# Patient Record
Sex: FEMALE | Race: WHITE | NOT HISPANIC OR LATINO | URBAN - METROPOLITAN AREA
[De-identification: names, ages, dates, MRNs, and addresses within clinical notes are randomized per-mention and may not be internally consistent; named-entity substitution may affect disease eponyms.]

---

## 2017-04-12 PROBLEM — Z00.00 ENCOUNTER FOR PREVENTIVE HEALTH EXAMINATION: Status: ACTIVE | Noted: 2017-04-12

## 2017-07-12 ENCOUNTER — OUTPATIENT (OUTPATIENT)
Dept: OUTPATIENT SERVICES | Facility: HOSPITAL | Age: 77
LOS: 1 days | End: 2017-07-12
Payer: COMMERCIAL

## 2017-07-12 DIAGNOSIS — R06.09 OTHER FORMS OF DYSPNEA: ICD-10-CM

## 2017-07-12 PROCEDURE — A9502: CPT

## 2017-07-12 PROCEDURE — 93017 CV STRESS TEST TRACING ONLY: CPT

## 2017-07-12 PROCEDURE — 78452 HT MUSCLE IMAGE SPECT MULT: CPT

## 2017-07-26 ENCOUNTER — APPOINTMENT (OUTPATIENT)
Dept: RADIOLOGY | Facility: HOSPITAL | Age: 77
End: 2017-07-26

## 2017-07-26 ENCOUNTER — OUTPATIENT (OUTPATIENT)
Dept: OUTPATIENT SERVICES | Facility: HOSPITAL | Age: 77
LOS: 1 days | End: 2017-07-26
Payer: MEDICARE

## 2017-07-26 ENCOUNTER — OUTPATIENT (OUTPATIENT)
Dept: OUTPATIENT SERVICES | Facility: HOSPITAL | Age: 77
LOS: 1 days | End: 2017-07-26

## 2017-07-26 ENCOUNTER — APPOINTMENT (OUTPATIENT)
Dept: NUCLEAR MEDICINE | Facility: HOSPITAL | Age: 77
End: 2017-07-26

## 2017-07-26 DIAGNOSIS — R06.02 SHORTNESS OF BREATH: ICD-10-CM

## 2017-07-26 DIAGNOSIS — R06.09 OTHER FORMS OF DYSPNEA: ICD-10-CM

## 2017-07-26 PROCEDURE — 71020: CPT | Mod: 26

## 2017-09-19 ENCOUNTER — APPOINTMENT (OUTPATIENT)
Dept: OTOLARYNGOLOGY | Facility: CLINIC | Age: 77
End: 2017-09-19
Payer: MEDICARE

## 2017-09-19 VITALS
SYSTOLIC BLOOD PRESSURE: 130 MMHG | WEIGHT: 126 LBS | DIASTOLIC BLOOD PRESSURE: 66 MMHG | HEART RATE: 62 BPM | HEIGHT: 64 IN | BODY MASS INDEX: 21.51 KG/M2

## 2017-09-19 PROCEDURE — 69210 REMOVE IMPACTED EAR WAX UNI: CPT

## 2017-09-19 PROCEDURE — 99204 OFFICE O/P NEW MOD 45 MIN: CPT | Mod: 25

## 2017-09-19 RX ORDER — LOSARTAN POTASSIUM 100 MG/1
100 TABLET, FILM COATED ORAL
Refills: 0 | Status: ACTIVE | COMMUNITY

## 2017-09-29 ENCOUNTER — APPOINTMENT (OUTPATIENT)
Dept: PULMONOLOGY | Facility: CLINIC | Age: 77
End: 2017-09-29
Payer: MEDICARE

## 2017-09-29 VITALS
OXYGEN SATURATION: 94 % | WEIGHT: 126 LBS | DIASTOLIC BLOOD PRESSURE: 71 MMHG | SYSTOLIC BLOOD PRESSURE: 138 MMHG | RESPIRATION RATE: 16 BRPM | HEART RATE: 67 BPM | BODY MASS INDEX: 21.51 KG/M2 | HEIGHT: 64 IN

## 2017-09-29 DIAGNOSIS — R06.02 SHORTNESS OF BREATH: ICD-10-CM

## 2017-09-29 DIAGNOSIS — Z82.49 FAMILY HISTORY OF ISCHEMIC HEART DISEASE AND OTHER DISEASES OF THE CIRCULATORY SYSTEM: ICD-10-CM

## 2017-09-29 PROCEDURE — 94729 DIFFUSING CAPACITY: CPT

## 2017-09-29 PROCEDURE — 94060 EVALUATION OF WHEEZING: CPT

## 2017-09-29 PROCEDURE — ZZZZZ: CPT

## 2017-09-29 PROCEDURE — 99204 OFFICE O/P NEW MOD 45 MIN: CPT | Mod: 25

## 2017-09-29 PROCEDURE — 94726 PLETHYSMOGRAPHY LUNG VOLUMES: CPT

## 2018-04-02 ENCOUNTER — APPOINTMENT (OUTPATIENT)
Dept: ORTHOPEDIC SURGERY | Facility: CLINIC | Age: 78
End: 2018-04-02

## 2018-07-10 ENCOUNTER — APPOINTMENT (OUTPATIENT)
Dept: OTOLARYNGOLOGY | Facility: CLINIC | Age: 78
End: 2018-07-10
Payer: MEDICARE

## 2018-07-10 VITALS
BODY MASS INDEX: 22.15 KG/M2 | DIASTOLIC BLOOD PRESSURE: 83 MMHG | SYSTOLIC BLOOD PRESSURE: 141 MMHG | WEIGHT: 125 LBS | HEIGHT: 63 IN | HEART RATE: 56 BPM

## 2018-07-10 PROCEDURE — 69210 REMOVE IMPACTED EAR WAX UNI: CPT

## 2018-07-10 PROCEDURE — 99213 OFFICE O/P EST LOW 20 MIN: CPT | Mod: 25

## 2018-09-18 ENCOUNTER — APPOINTMENT (OUTPATIENT)
Dept: OTOLARYNGOLOGY | Facility: CLINIC | Age: 78
End: 2018-09-18
Payer: MEDICARE

## 2018-09-18 VITALS
BODY MASS INDEX: 22.15 KG/M2 | WEIGHT: 125 LBS | SYSTOLIC BLOOD PRESSURE: 132 MMHG | HEART RATE: 60 BPM | DIASTOLIC BLOOD PRESSURE: 61 MMHG | HEIGHT: 63 IN

## 2018-09-18 PROCEDURE — 99213 OFFICE O/P EST LOW 20 MIN: CPT | Mod: 25

## 2018-09-18 PROCEDURE — 69210 REMOVE IMPACTED EAR WAX UNI: CPT

## 2018-10-12 ENCOUNTER — APPOINTMENT (OUTPATIENT)
Dept: ENDOCRINOLOGY | Facility: CLINIC | Age: 78
End: 2018-10-12
Payer: MEDICARE

## 2018-10-12 VITALS
TEMPERATURE: 98 F | RESPIRATION RATE: 16 BRPM | BODY MASS INDEX: 22.5 KG/M2 | SYSTOLIC BLOOD PRESSURE: 158 MMHG | HEART RATE: 57 BPM | DIASTOLIC BLOOD PRESSURE: 74 MMHG | WEIGHT: 127 LBS | OXYGEN SATURATION: 96 % | HEIGHT: 63 IN

## 2018-10-12 DIAGNOSIS — Z87.898 PERSONAL HISTORY OF OTHER SPECIFIED CONDITIONS: ICD-10-CM

## 2018-10-12 DIAGNOSIS — Z85.9 PERSONAL HISTORY OF MALIGNANT NEOPLASM, UNSPECIFIED: ICD-10-CM

## 2018-10-12 DIAGNOSIS — Z82.49 FAMILY HISTORY OF ISCHEMIC HEART DISEASE AND OTHER DISEASES OF THE CIRCULATORY SYSTEM: ICD-10-CM

## 2018-10-12 DIAGNOSIS — Z86.79 PERSONAL HISTORY OF OTHER DISEASES OF THE CIRCULATORY SYSTEM: ICD-10-CM

## 2018-10-12 DIAGNOSIS — R06.09 OTHER FORMS OF DYSPNEA: ICD-10-CM

## 2018-10-12 DIAGNOSIS — Z80.52 FAMILY HISTORY OF MALIGNANT NEOPLASM OF BLADDER: ICD-10-CM

## 2018-10-12 DIAGNOSIS — Z87.39 PERSONAL HISTORY OF OTHER DISEASES OF THE MUSCULOSKELETAL SYSTEM AND CONNECTIVE TISSUE: ICD-10-CM

## 2018-10-12 DIAGNOSIS — Z78.9 OTHER SPECIFIED HEALTH STATUS: ICD-10-CM

## 2018-10-12 DIAGNOSIS — I10 ESSENTIAL (PRIMARY) HYPERTENSION: ICD-10-CM

## 2018-10-12 DIAGNOSIS — Z80.0 FAMILY HISTORY OF MALIGNANT NEOPLASM OF DIGESTIVE ORGANS: ICD-10-CM

## 2018-10-12 DIAGNOSIS — Z80.8 FAMILY HISTORY OF MALIGNANT NEOPLASM OF OTHER ORGANS OR SYSTEMS: ICD-10-CM

## 2018-10-12 PROCEDURE — 99204 OFFICE O/P NEW MOD 45 MIN: CPT

## 2018-10-14 LAB
25(OH)D3 SERPL-MCNC: 38.9 NG/ML
ANION GAP SERPL CALC-SCNC: 14 MMOL/L
BASOPHILS # BLD AUTO: 0.02 K/UL
BASOPHILS NFR BLD AUTO: 0.4 %
BUN SERPL-MCNC: 16 MG/DL
CALCIUM SERPL-MCNC: 10 MG/DL
CALCIUM SERPL-MCNC: 10 MG/DL
CHLORIDE SERPL-SCNC: 102 MMOL/L
CO2 SERPL-SCNC: 26 MMOL/L
CREAT SERPL-MCNC: 0.72 MG/DL
EOSINOPHIL # BLD AUTO: 0.03 K/UL
EOSINOPHIL NFR BLD AUTO: 0.6 %
ESTRADIOL SERPL-MCNC: 13 PG/ML
FSH SERPL-MCNC: 65.3 IU/L
GLUCOSE SERPL-MCNC: 84 MG/DL
HCT VFR BLD CALC: 41.8 %
HGB BLD-MCNC: 12.7 G/DL
IMM GRANULOCYTES NFR BLD AUTO: 0.2 %
LH SERPL-ACNC: 31.9 IU/L
LYMPHOCYTES # BLD AUTO: 1.67 K/UL
LYMPHOCYTES NFR BLD AUTO: 31.2 %
MAN DIFF?: NORMAL
MCHC RBC-ENTMCNC: 30.4 GM/DL
MCHC RBC-ENTMCNC: 30.9 PG
MCV RBC AUTO: 101.7 FL
MONOCYTES # BLD AUTO: 0.42 K/UL
MONOCYTES NFR BLD AUTO: 7.9 %
NEUTROPHILS # BLD AUTO: 3.2 K/UL
NEUTROPHILS NFR BLD AUTO: 59.7 %
PARATHYROID HORMONE INTACT: 32 PG/ML
PLATELET # BLD AUTO: 244 K/UL
POTASSIUM SERPL-SCNC: 4.4 MMOL/L
PROLACTIN SERPL-MCNC: 16 NG/ML
RBC # BLD: 4.11 M/UL
RBC # FLD: 13.6 %
SODIUM SERPL-SCNC: 142 MMOL/L
T4 FREE SERPL-MCNC: 1.1 NG/DL
TSH SERPL-ACNC: 0.78 UIU/ML
WBC # FLD AUTO: 5.35 K/UL

## 2018-11-21 ENCOUNTER — APPOINTMENT (OUTPATIENT)
Dept: ENDOCRINOLOGY | Facility: CLINIC | Age: 78
End: 2018-11-21
Payer: MEDICARE

## 2018-11-21 VITALS
OXYGEN SATURATION: 96 % | BODY MASS INDEX: 22.15 KG/M2 | HEART RATE: 62 BPM | TEMPERATURE: 97.4 F | HEIGHT: 63 IN | DIASTOLIC BLOOD PRESSURE: 80 MMHG | WEIGHT: 125 LBS | RESPIRATION RATE: 16 BRPM | SYSTOLIC BLOOD PRESSURE: 147 MMHG

## 2018-11-21 DIAGNOSIS — E78.5 HYPERLIPIDEMIA, UNSPECIFIED: ICD-10-CM

## 2018-11-21 PROCEDURE — 99213 OFFICE O/P EST LOW 20 MIN: CPT

## 2018-12-03 ENCOUNTER — APPOINTMENT (OUTPATIENT)
Dept: ENDOCRINOLOGY | Facility: CLINIC | Age: 78
End: 2018-12-03
Payer: MEDICARE

## 2018-12-03 VITALS
WEIGHT: 128 LBS | HEIGHT: 63 IN | RESPIRATION RATE: 16 BRPM | BODY MASS INDEX: 22.68 KG/M2 | SYSTOLIC BLOOD PRESSURE: 146 MMHG | HEART RATE: 58 BPM | DIASTOLIC BLOOD PRESSURE: 82 MMHG | OXYGEN SATURATION: 97 %

## 2018-12-03 PROCEDURE — 96372 THER/PROPH/DIAG INJ SC/IM: CPT

## 2018-12-03 RX ORDER — DENOSUMAB 60 MG/ML
60 INJECTION SUBCUTANEOUS
Qty: 1 | Refills: 0 | Status: COMPLETED | OUTPATIENT
Start: 2018-12-03

## 2018-12-03 RX ADMIN — DENOSUMAB 0 MG/ML: 60 INJECTION SUBCUTANEOUS at 00:00

## 2019-01-03 ENCOUNTER — APPOINTMENT (OUTPATIENT)
Dept: ENDOCRINOLOGY | Facility: CLINIC | Age: 79
End: 2019-01-03

## 2019-03-19 ENCOUNTER — APPOINTMENT (OUTPATIENT)
Dept: OTOLARYNGOLOGY | Facility: CLINIC | Age: 79
End: 2019-03-19
Payer: MEDICARE

## 2019-03-19 VITALS
HEIGHT: 63 IN | BODY MASS INDEX: 22.68 KG/M2 | HEART RATE: 57 BPM | DIASTOLIC BLOOD PRESSURE: 70 MMHG | WEIGHT: 128 LBS | SYSTOLIC BLOOD PRESSURE: 134 MMHG

## 2019-03-19 PROCEDURE — 69210 REMOVE IMPACTED EAR WAX UNI: CPT

## 2019-03-19 PROCEDURE — 99213 OFFICE O/P EST LOW 20 MIN: CPT | Mod: 25

## 2019-03-19 RX ORDER — ASPIRIN 81 MG
81 TABLET, DELAYED RELEASE (ENTERIC COATED) ORAL
Refills: 0 | Status: COMPLETED | COMMUNITY
End: 2019-03-19

## 2019-03-19 RX ORDER — ATORVASTATIN CALCIUM 20 MG/1
20 TABLET, FILM COATED ORAL
Refills: 0 | Status: COMPLETED | COMMUNITY
End: 2019-03-19

## 2019-03-19 RX ORDER — FUROSEMIDE 20 MG/1
20 TABLET ORAL
Refills: 0 | Status: COMPLETED | COMMUNITY
End: 2019-03-19

## 2019-03-19 RX ORDER — LOSARTAN POTASSIUM 100 MG/1
100 TABLET, FILM COATED ORAL
Refills: 0 | Status: COMPLETED | COMMUNITY
End: 2019-03-19

## 2019-03-19 NOTE — ASSESSMENT
[FreeTextEntry1] : 78F with bilateral muffled hearing in the setting of severe cerumen impaction. Cerumen was removed from either ear with immediate improvement in hearing. The rest of the complete and comprehensive head and neck exam is unremarkable. Advised to keep her ears dry. RTO 6 months for routine cleaning.

## 2019-03-19 NOTE — HISTORY OF PRESENT ILLNESS
[de-identified] : 78F here in followup.\par \par She thinks there is reaccumulation of cerumen since her hearing is now muffled.\par Both ears affected equally. She was seen 6 months prior for severe cerumen impaction in the setting of decreasing hearing. There is no otorrhea, otalgia, tinnitus or vertigo. No recent ear infection. \par \par ROS otherwise negative.

## 2019-03-19 NOTE — CONSULT LETTER
[Dear  ___] : Dear  [unfilled], [Courtesy Letter:] : I had the pleasure of seeing your patient, [unfilled], in my office today. [Consult Closing:] : Thank you very much for allowing me to participate in the care of this patient.  If you have any questions, please do not hesitate to contact me. [Sincerely,] : Sincerely, [Manjit Goins MD] : Manjit Goins MD  [Department of Otolaryngology, Head and Neck Surgery] : Department of Otolaryngology, Head and Neck Surgery [[facility]] : [facility]

## 2019-03-19 NOTE — PHYSICAL EXAM
[Midline] : trachea located in midline position [Normal] : no rashes [FreeTextEntry1] : Au: external ear wnl. EAC occluded with thick moist cerumen, removed w suction. TM intact and mobile, ME clear.\par \par immediate improvement in b/l hearing when disimpacted

## 2019-06-14 ENCOUNTER — APPOINTMENT (OUTPATIENT)
Dept: ENDOCRINOLOGY | Facility: CLINIC | Age: 79
End: 2019-06-14
Payer: MEDICARE

## 2019-06-14 VITALS
OXYGEN SATURATION: 94 % | HEART RATE: 63 BPM | DIASTOLIC BLOOD PRESSURE: 66 MMHG | BODY MASS INDEX: 22.15 KG/M2 | RESPIRATION RATE: 16 BRPM | SYSTOLIC BLOOD PRESSURE: 113 MMHG | WEIGHT: 125 LBS | HEIGHT: 63 IN

## 2019-06-14 PROCEDURE — 99213 OFFICE O/P EST LOW 20 MIN: CPT

## 2019-06-14 RX ORDER — DENOSUMAB 60 MG/ML
60 INJECTION SUBCUTANEOUS
Qty: 1 | Refills: 0 | Status: ACTIVE | COMMUNITY
Start: 2019-06-14 | End: 1900-01-01

## 2019-06-14 RX ORDER — DENOSUMAB 60 MG/ML
60 INJECTION SUBCUTANEOUS
Qty: 1 | Refills: 0 | Status: COMPLETED | COMMUNITY
Start: 2018-11-21 | End: 2019-06-14

## 2019-06-15 NOTE — ASSESSMENT
[FreeTextEntry1] : Patient has Osteoporosis\par Advised to take regularly CValcium plus Vit D\par Advised to walk regularly\par Will order the Prolia injection now

## 2019-06-15 NOTE — PHYSICAL EXAM
[No Acute Distress] : no acute distress [Alert] : alert [Normal Sclera/Conjunctiva] : normal sclera/conjunctiva [PERRL] : pupils equal, round and reactive to light [Normal Outer Ear/Nose] : the ears and nose were normal in appearance [Normal Hearing] : hearing was normal [No Neck Mass] : no neck mass was observed [Thyroid Not Enlarged] : the thyroid was not enlarged [No Respiratory Distress] : no respiratory distress [Normal Rate and Effort] : normal respiratory rhythm and effort [Normal PMI] : the apical impulse was normal [Normal Rate] : heart rate was normal  [Carotids Normal] : carotid pulses were normal with no bruits [Normal Gait] : normal gait [No Clubbing, Cyanosis] : no clubbing  or cyanosis of the fingernails [No Rash] : no rash [No Skin Lesions] : no skin lesions [Normal Reflexes] : deep tendon reflexes were 2+ and symmetric [No Motor Deficits] : the motor exam was normal

## 2019-06-15 NOTE — HISTORY OF PRESENT ILLNESS
[FreeTextEntry1] : Doing well, she has pains on her knees. No back pains. No fragility fractures. The last DEXA scan was done 10/18. On 11/18 she had he Prolia injection.

## 2019-06-19 ENCOUNTER — APPOINTMENT (OUTPATIENT)
Dept: ENDOCRINOLOGY | Facility: CLINIC | Age: 79
End: 2019-06-19
Payer: MEDICARE

## 2019-06-19 VITALS — DIASTOLIC BLOOD PRESSURE: 70 MMHG | RESPIRATION RATE: 16 BRPM | HEART RATE: 60 BPM | SYSTOLIC BLOOD PRESSURE: 131 MMHG

## 2019-06-19 VITALS — HEART RATE: 61 BPM | DIASTOLIC BLOOD PRESSURE: 69 MMHG | SYSTOLIC BLOOD PRESSURE: 128 MMHG

## 2019-06-19 DIAGNOSIS — M81.0 AGE-RELATED OSTEOPOROSIS W/OUT CURRENT PATHOLOGICAL FRACTURE: ICD-10-CM

## 2019-06-19 PROCEDURE — 96401 CHEMO ANTI-NEOPL SQ/IM: CPT

## 2019-06-19 RX ORDER — DENOSUMAB 60 MG/ML
60 INJECTION SUBCUTANEOUS
Qty: 1 | Refills: 0 | Status: COMPLETED | OUTPATIENT
Start: 2019-06-19

## 2019-06-19 RX ADMIN — DENOSUMAB 0 MG/ML: 60 INJECTION SUBCUTANEOUS at 00:00

## 2019-10-03 ENCOUNTER — APPOINTMENT (OUTPATIENT)
Dept: OTOLARYNGOLOGY | Facility: CLINIC | Age: 79
End: 2019-10-03
Payer: MEDICARE

## 2019-10-03 VITALS
DIASTOLIC BLOOD PRESSURE: 73 MMHG | HEIGHT: 63 IN | SYSTOLIC BLOOD PRESSURE: 127 MMHG | HEART RATE: 59 BPM | BODY MASS INDEX: 22.5 KG/M2 | WEIGHT: 127 LBS

## 2019-10-03 DIAGNOSIS — H91.93 UNSPECIFIED HEARING LOSS, BILATERAL: ICD-10-CM

## 2019-10-03 DIAGNOSIS — H61.23 IMPACTED CERUMEN, BILATERAL: ICD-10-CM

## 2019-10-03 PROCEDURE — 69210 REMOVE IMPACTED EAR WAX UNI: CPT

## 2019-10-03 PROCEDURE — 99213 OFFICE O/P EST LOW 20 MIN: CPT | Mod: 25

## 2019-10-03 NOTE — ASSESSMENT
[FreeTextEntry1] : 79F with bilateral muffled hearing in the setting of severe cerumen impaction. Cerumen was removed from either ear with suction with immediate improvement in hearing. The rest of the complete and comprehensive head and neck exam is unremarkable. Advised to keep her ears dry. RTO 6 months for routine cleaning.

## 2019-10-03 NOTE — HISTORY OF PRESENT ILLNESS
[de-identified] : 79F here in followup.\par \par She thinks there is reaccumulation of cerumen since her hearing is now muffled.\par Both ears affected equally. She was seen 6 months prior for severe cerumen impaction in the setting of decreasing hearing. There is no otorrhea, otalgia, tinnitus or vertigo. No recent ear infection. \par \par ROS otherwise negative.

## 2019-10-03 NOTE — CONSULT LETTER
[Dear  ___] : Dear  [unfilled], [Courtesy Letter:] : I had the pleasure of seeing your patient, [unfilled], in my office today. [Consult Closing:] : Thank you very much for allowing me to participate in the care of this patient.  If you have any questions, please do not hesitate to contact me. [Sincerely,] : Sincerely, [Manjit Goins MD] : Manjit Goins MD  [Department of Otolaryngology, Head and Neck Surgery] : Department of Otolaryngology, Head and Neck Surgery [[facility]] : [facility]

## 2019-10-03 NOTE — PHYSICAL EXAM
[Midline] : trachea located in midline position [Normal] : no rashes [FreeTextEntry1] : Au: external ear wnl. EAC (left>right) occluded with thick moist cerumen, removed w suction. TM intact and mobile, ME clear.\par \par immediate improvement in b/l hearing when disimpacted

## 2020-04-03 ENCOUNTER — APPOINTMENT (OUTPATIENT)
Dept: OTOLARYNGOLOGY | Facility: CLINIC | Age: 80
End: 2020-04-03

## 2020-07-21 ENCOUNTER — APPOINTMENT (OUTPATIENT)
Dept: OTOLARYNGOLOGY | Facility: CLINIC | Age: 80
End: 2020-07-21

## 2021-10-25 DIAGNOSIS — Z01.818 ENCOUNTER FOR OTHER PREPROCEDURAL EXAMINATION: ICD-10-CM

## 2021-10-26 ENCOUNTER — APPOINTMENT (OUTPATIENT)
Dept: DISASTER EMERGENCY | Facility: CLINIC | Age: 81
End: 2021-10-26

## 2021-10-27 LAB — SARS-COV-2 N GENE NPH QL NAA+PROBE: NOT DETECTED

## 2021-10-29 ENCOUNTER — OUTPATIENT (OUTPATIENT)
Dept: OUTPATIENT SERVICES | Facility: HOSPITAL | Age: 81
LOS: 1 days | Discharge: HOME | End: 2021-10-29
Payer: MEDICARE

## 2021-10-29 DIAGNOSIS — Z90.710 ACQUIRED ABSENCE OF BOTH CERVIX AND UTERUS: Chronic | ICD-10-CM

## 2021-10-29 DIAGNOSIS — Z98.890 OTHER SPECIFIED POSTPROCEDURAL STATES: Chronic | ICD-10-CM

## 2021-10-29 DIAGNOSIS — R07.9 CHEST PAIN, UNSPECIFIED: ICD-10-CM

## 2021-10-29 LAB
ANION GAP SERPL CALC-SCNC: 12 MMOL/L — SIGNIFICANT CHANGE UP (ref 7–14)
BUN SERPL-MCNC: 17 MG/DL — SIGNIFICANT CHANGE UP (ref 7–23)
CALCIUM SERPL-MCNC: 9.6 MG/DL — SIGNIFICANT CHANGE UP (ref 8.4–10.5)
CHLORIDE SERPL-SCNC: 104 MMOL/L — SIGNIFICANT CHANGE UP (ref 98–107)
CO2 SERPL-SCNC: 25 MMOL/L — SIGNIFICANT CHANGE UP (ref 22–31)
CREAT SERPL-MCNC: 0.68 MG/DL — SIGNIFICANT CHANGE UP (ref 0.5–1.3)
GLUCOSE SERPL-MCNC: 96 MG/DL — SIGNIFICANT CHANGE UP (ref 70–99)
HCT VFR BLD CALC: 38.8 % — SIGNIFICANT CHANGE UP (ref 34.5–45)
HGB BLD-MCNC: 12.3 G/DL — SIGNIFICANT CHANGE UP (ref 11.5–15.5)
MCHC RBC-ENTMCNC: 31.7 GM/DL — LOW (ref 32–36)
MCHC RBC-ENTMCNC: 32.5 PG — SIGNIFICANT CHANGE UP (ref 27–34)
MCV RBC AUTO: 102.4 FL — HIGH (ref 80–100)
NRBC # BLD: 0 /100 WBCS — SIGNIFICANT CHANGE UP
NRBC # FLD: 0 K/UL — SIGNIFICANT CHANGE UP
PLATELET # BLD AUTO: 209 K/UL — SIGNIFICANT CHANGE UP (ref 150–400)
POTASSIUM SERPL-MCNC: 3.8 MMOL/L — SIGNIFICANT CHANGE UP (ref 3.5–5.3)
POTASSIUM SERPL-SCNC: 3.8 MMOL/L — SIGNIFICANT CHANGE UP (ref 3.5–5.3)
RBC # BLD: 3.79 M/UL — LOW (ref 3.8–5.2)
RBC # FLD: 12.7 % — SIGNIFICANT CHANGE UP (ref 10.3–14.5)
SODIUM SERPL-SCNC: 141 MMOL/L — SIGNIFICANT CHANGE UP (ref 135–145)
WBC # BLD: 4.92 K/UL — SIGNIFICANT CHANGE UP (ref 3.8–10.5)
WBC # FLD AUTO: 4.92 K/UL — SIGNIFICANT CHANGE UP (ref 3.8–10.5)

## 2021-10-29 PROCEDURE — 99152 MOD SED SAME PHYS/QHP 5/>YRS: CPT

## 2021-10-29 PROCEDURE — 93010 ELECTROCARDIOGRAM REPORT: CPT | Mod: 76

## 2021-10-29 PROCEDURE — 92928 PRQ TCAT PLMT NTRAC ST 1 LES: CPT | Mod: RC

## 2021-10-29 PROCEDURE — 93458 L HRT ARTERY/VENTRICLE ANGIO: CPT | Mod: 26,59

## 2021-10-29 RX ORDER — METOPROLOL TARTRATE 50 MG
0.5 TABLET ORAL
Qty: 30 | Refills: 0
Start: 2021-10-29 | End: 2021-11-27

## 2021-10-29 RX ORDER — OMEGA-3 ACID ETHYL ESTERS 1 G
1 CAPSULE ORAL
Qty: 0 | Refills: 0 | DISCHARGE

## 2021-10-29 RX ORDER — SODIUM CHLORIDE 9 MG/ML
1000 INJECTION INTRAMUSCULAR; INTRAVENOUS; SUBCUTANEOUS
Refills: 0 | Status: DISCONTINUED | OUTPATIENT
Start: 2021-10-29 | End: 2021-11-12

## 2021-10-29 RX ORDER — ISOSORBIDE MONONITRATE 60 MG/1
1 TABLET, EXTENDED RELEASE ORAL
Qty: 0 | Refills: 0 | DISCHARGE

## 2021-10-29 RX ORDER — ATORVASTATIN CALCIUM 80 MG/1
1 TABLET, FILM COATED ORAL
Qty: 0 | Refills: 0 | DISCHARGE

## 2021-10-29 RX ORDER — FUROSEMIDE 40 MG
1 TABLET ORAL
Qty: 0 | Refills: 0 | DISCHARGE

## 2021-10-29 RX ORDER — ASPIRIN/CALCIUM CARB/MAGNESIUM 324 MG
1 TABLET ORAL
Qty: 0 | Refills: 0 | DISCHARGE

## 2021-10-29 RX ORDER — SODIUM CHLORIDE 9 MG/ML
3 INJECTION INTRAMUSCULAR; INTRAVENOUS; SUBCUTANEOUS EVERY 8 HOURS
Refills: 0 | Status: DISCONTINUED | OUTPATIENT
Start: 2021-10-29 | End: 2021-11-12

## 2021-10-29 RX ORDER — METOPROLOL TARTRATE 50 MG
1 TABLET ORAL
Qty: 0 | Refills: 0 | DISCHARGE

## 2021-10-29 RX ORDER — CLOPIDOGREL BISULFATE 75 MG/1
1 TABLET, FILM COATED ORAL
Qty: 30 | Refills: 3
Start: 2021-10-29 | End: 2022-02-25

## 2021-10-29 RX ADMIN — SODIUM CHLORIDE 75 MILLILITER(S): 9 INJECTION INTRAMUSCULAR; INTRAVENOUS; SUBCUTANEOUS at 11:12

## 2021-10-29 RX ADMIN — SODIUM CHLORIDE 3 MILLILITER(S): 9 INJECTION INTRAMUSCULAR; INTRAVENOUS; SUBCUTANEOUS at 17:37

## 2021-10-29 NOTE — H&P CARDIOLOGY - NSICDXPASTMEDICALHX_GEN_ALL_CORE_FT
PAST MEDICAL HISTORY:  Essential hypertension     HLD (hyperlipidemia)     Squamous cell cancer of lip

## 2021-10-29 NOTE — H&P CARDIOLOGY - NSICDXFAMILYHX_GEN_ALL_CORE_FT
FAMILY HISTORY:  Mother  Still living? Unknown  FH: CHF (congestive heart failure), Age at diagnosis: Age Unknown

## 2021-10-29 NOTE — H&P CARDIOLOGY - HISTORY OF PRESENT ILLNESS
80 y/o F with PMH of HTN, HLD presented to [facility] for C for abnormal stress test. As per the patient she has been having chronic >2 years of midsternal chest heaviness and MERRITT. Patient stated that the past year these symptoms have worsened. Patient stated that any minimal exertion she would feel winded and SOB. Patient stated that the chest heaviness is located on the middle of the chest, characterized as a sharp pain, aggravated with exertion, relief with rest, without any radiation, with a severity of 6/10. Patient stated that she is able to walk multiple blocks but it "takes her time to do it". Patient otherwise denied any rest chest pain, fevers, chills, N/V/D/C, abdominal pain, dysuria, melena, hematochezia, recent travel, sick contact, cough, body aches, pleuritic or positional chest pain.     COVID PCR not detected on 10/26/21

## 2021-11-12 ENCOUNTER — TRANSCRIPTION ENCOUNTER (OUTPATIENT)
Age: 81
End: 2021-11-12

## 2024-04-03 ENCOUNTER — INPATIENT (INPATIENT)
Facility: HOSPITAL | Age: 84
LOS: 1 days | Discharge: HOME | DRG: 812 | End: 2024-04-05
Attending: STUDENT IN AN ORGANIZED HEALTH CARE EDUCATION/TRAINING PROGRAM | Admitting: STUDENT IN AN ORGANIZED HEALTH CARE EDUCATION/TRAINING PROGRAM
Payer: COMMERCIAL

## 2024-04-03 VITALS
TEMPERATURE: 98 F | OXYGEN SATURATION: 96 % | SYSTOLIC BLOOD PRESSURE: 131 MMHG | HEART RATE: 72 BPM | RESPIRATION RATE: 18 BRPM | WEIGHT: 130.07 LBS | DIASTOLIC BLOOD PRESSURE: 58 MMHG

## 2024-04-03 DIAGNOSIS — Z29.9 ENCOUNTER FOR PROPHYLACTIC MEASURES, UNSPECIFIED: ICD-10-CM

## 2024-04-03 DIAGNOSIS — I25.10 ATHEROSCLEROTIC HEART DISEASE OF NATIVE CORONARY ARTERY WITHOUT ANGINA PECTORIS: ICD-10-CM

## 2024-04-03 DIAGNOSIS — Z98.890 OTHER SPECIFIED POSTPROCEDURAL STATES: Chronic | ICD-10-CM

## 2024-04-03 DIAGNOSIS — I10 ESSENTIAL (PRIMARY) HYPERTENSION: ICD-10-CM

## 2024-04-03 DIAGNOSIS — D64.9 ANEMIA, UNSPECIFIED: ICD-10-CM

## 2024-04-03 DIAGNOSIS — E78.5 HYPERLIPIDEMIA, UNSPECIFIED: ICD-10-CM

## 2024-04-03 DIAGNOSIS — Z90.710 ACQUIRED ABSENCE OF BOTH CERVIX AND UTERUS: Chronic | ICD-10-CM

## 2024-04-03 PROBLEM — C44.02 SQUAMOUS CELL CARCINOMA OF SKIN OF LIP: Chronic | Status: ACTIVE | Noted: 2021-10-29

## 2024-04-03 LAB
ALBUMIN SERPL ELPH-MCNC: 2.6 G/DL — LOW (ref 3.5–5)
ALP SERPL-CCNC: 71 U/L — SIGNIFICANT CHANGE UP (ref 40–120)
ALT FLD-CCNC: 21 U/L DA — SIGNIFICANT CHANGE UP (ref 10–60)
ANION GAP SERPL CALC-SCNC: 3 MMOL/L — LOW (ref 5–17)
ANISOCYTOSIS BLD QL: SIGNIFICANT CHANGE UP
AST SERPL-CCNC: 19 U/L — SIGNIFICANT CHANGE UP (ref 10–40)
BASOPHILS # BLD AUTO: 0.04 K/UL — SIGNIFICANT CHANGE UP (ref 0–0.2)
BASOPHILS NFR BLD AUTO: 0.8 % — SIGNIFICANT CHANGE UP (ref 0–2)
BILIRUB SERPL-MCNC: 0.1 MG/DL — LOW (ref 0.2–1.2)
BLD GP AB SCN SERPL QL: SIGNIFICANT CHANGE UP
BUN SERPL-MCNC: 16 MG/DL — SIGNIFICANT CHANGE UP (ref 7–18)
CALCIUM SERPL-MCNC: 8.5 MG/DL — SIGNIFICANT CHANGE UP (ref 8.4–10.5)
CHLORIDE SERPL-SCNC: 105 MMOL/L — SIGNIFICANT CHANGE UP (ref 96–108)
CO2 SERPL-SCNC: 29 MMOL/L — SIGNIFICANT CHANGE UP (ref 22–31)
CREAT SERPL-MCNC: 0.69 MG/DL — SIGNIFICANT CHANGE UP (ref 0.5–1.3)
EGFR: 84 ML/MIN/1.73M2 — SIGNIFICANT CHANGE UP
ELLIPTOCYTES BLD QL SMEAR: SLIGHT — SIGNIFICANT CHANGE UP
EOSINOPHIL # BLD AUTO: 0.03 K/UL — SIGNIFICANT CHANGE UP (ref 0–0.5)
EOSINOPHIL NFR BLD AUTO: 0.6 % — SIGNIFICANT CHANGE UP (ref 0–6)
GLUCOSE SERPL-MCNC: 108 MG/DL — HIGH (ref 70–99)
HCT VFR BLD CALC: 20.7 % — CRITICAL LOW (ref 34.5–45)
HCT VFR BLD CALC: 24.8 % — LOW (ref 34.5–45)
HGB BLD-MCNC: 6 G/DL — CRITICAL LOW (ref 11.5–15.5)
HGB BLD-MCNC: 7.4 G/DL — LOW (ref 11.5–15.5)
HYPOCHROMIA BLD QL: SLIGHT — SIGNIFICANT CHANGE UP
IMM GRANULOCYTES NFR BLD AUTO: 0.4 % — SIGNIFICANT CHANGE UP (ref 0–0.9)
IRON SATN MFR SERPL: 12 UG/DL — LOW (ref 40–150)
IRON SATN MFR SERPL: 4 % — LOW (ref 15–50)
LDH SERPL L TO P-CCNC: 208 U/L — SIGNIFICANT CHANGE UP (ref 120–225)
LYMPHOCYTES # BLD AUTO: 1 K/UL — SIGNIFICANT CHANGE UP (ref 1–3.3)
LYMPHOCYTES # BLD AUTO: 20.8 % — SIGNIFICANT CHANGE UP (ref 13–44)
MANUAL SMEAR VERIFICATION: SIGNIFICANT CHANGE UP
MCHC RBC-ENTMCNC: 24.6 PG — LOW (ref 27–34)
MCHC RBC-ENTMCNC: 25.2 PG — LOW (ref 27–34)
MCHC RBC-ENTMCNC: 29 GM/DL — LOW (ref 32–36)
MCHC RBC-ENTMCNC: 29.8 GM/DL — LOW (ref 32–36)
MCV RBC AUTO: 84.4 FL — SIGNIFICANT CHANGE UP (ref 80–100)
MCV RBC AUTO: 84.8 FL — SIGNIFICANT CHANGE UP (ref 80–100)
MICROCYTES BLD QL: SIGNIFICANT CHANGE UP
MONOCYTES # BLD AUTO: 0.55 K/UL — SIGNIFICANT CHANGE UP (ref 0–0.9)
MONOCYTES NFR BLD AUTO: 11.5 % — SIGNIFICANT CHANGE UP (ref 2–14)
NEUTROPHILS # BLD AUTO: 3.16 K/UL — SIGNIFICANT CHANGE UP (ref 1.8–7.4)
NEUTROPHILS NFR BLD AUTO: 65.9 % — SIGNIFICANT CHANGE UP (ref 43–77)
NRBC # BLD: 0 /100 WBCS — SIGNIFICANT CHANGE UP (ref 0–0)
NRBC # BLD: 0 /100 WBCS — SIGNIFICANT CHANGE UP (ref 0–0)
OVALOCYTES BLD QL SMEAR: SLIGHT — SIGNIFICANT CHANGE UP
PLAT MORPH BLD: NORMAL — SIGNIFICANT CHANGE UP
PLATELET # BLD AUTO: 296 K/UL — SIGNIFICANT CHANGE UP (ref 150–400)
PLATELET # BLD AUTO: 327 K/UL — SIGNIFICANT CHANGE UP (ref 150–400)
POIKILOCYTOSIS BLD QL AUTO: SLIGHT — SIGNIFICANT CHANGE UP
POTASSIUM SERPL-MCNC: 3.9 MMOL/L — SIGNIFICANT CHANGE UP (ref 3.5–5.3)
POTASSIUM SERPL-SCNC: 3.9 MMOL/L — SIGNIFICANT CHANGE UP (ref 3.5–5.3)
PROT SERPL-MCNC: 6 G/DL — SIGNIFICANT CHANGE UP (ref 6–8.3)
RBC # BLD: 2.44 M/UL — LOW (ref 3.8–5.2)
RBC # BLD: 2.44 M/UL — LOW (ref 3.8–5.2)
RBC # BLD: 2.94 M/UL — LOW (ref 3.8–5.2)
RBC # FLD: 15.7 % — HIGH (ref 10.3–14.5)
RBC # FLD: 16.2 % — HIGH (ref 10.3–14.5)
RBC BLD AUTO: ABNORMAL
RETICS #: 50.1 K/UL — SIGNIFICANT CHANGE UP (ref 25–125)
RETICS/RBC NFR: 2 % — SIGNIFICANT CHANGE UP (ref 0.5–2.5)
SCHISTOCYTES BLD QL AUTO: SLIGHT — SIGNIFICANT CHANGE UP
SODIUM SERPL-SCNC: 137 MMOL/L — SIGNIFICANT CHANGE UP (ref 135–145)
TIBC SERPL-MCNC: 330 UG/DL — SIGNIFICANT CHANGE UP (ref 250–450)
UIBC SERPL-MCNC: 318 UG/DL — SIGNIFICANT CHANGE UP (ref 110–370)
WBC # BLD: 4.8 K/UL — SIGNIFICANT CHANGE UP (ref 3.8–10.5)
WBC # BLD: 5.2 K/UL — SIGNIFICANT CHANGE UP (ref 3.8–10.5)
WBC # FLD AUTO: 4.8 K/UL — SIGNIFICANT CHANGE UP (ref 3.8–10.5)
WBC # FLD AUTO: 5.2 K/UL — SIGNIFICANT CHANGE UP (ref 3.8–10.5)

## 2024-04-03 PROCEDURE — 99223 1ST HOSP IP/OBS HIGH 75: CPT | Mod: GC

## 2024-04-03 PROCEDURE — 71045 X-RAY EXAM CHEST 1 VIEW: CPT | Mod: 26

## 2024-04-03 PROCEDURE — 99285 EMERGENCY DEPT VISIT HI MDM: CPT

## 2024-04-03 RX ORDER — DONEPEZIL HYDROCHLORIDE 10 MG/1
5 TABLET, FILM COATED ORAL AT BEDTIME
Refills: 0 | Status: DISCONTINUED | OUTPATIENT
Start: 2024-04-03 | End: 2024-04-05

## 2024-04-03 RX ORDER — DONEPEZIL HYDROCHLORIDE 10 MG/1
1 TABLET, FILM COATED ORAL
Refills: 0 | DISCHARGE

## 2024-04-03 RX ORDER — ONDANSETRON 8 MG/1
4 TABLET, FILM COATED ORAL EVERY 8 HOURS
Refills: 0 | Status: DISCONTINUED | OUTPATIENT
Start: 2024-04-03 | End: 2024-04-05

## 2024-04-03 RX ORDER — PANTOPRAZOLE SODIUM 20 MG/1
40 TABLET, DELAYED RELEASE ORAL EVERY 12 HOURS
Refills: 0 | Status: DISCONTINUED | OUTPATIENT
Start: 2024-04-03 | End: 2024-04-05

## 2024-04-03 RX ORDER — ATORVASTATIN CALCIUM 80 MG/1
20 TABLET, FILM COATED ORAL AT BEDTIME
Refills: 0 | Status: DISCONTINUED | OUTPATIENT
Start: 2024-04-03 | End: 2024-04-05

## 2024-04-03 RX ORDER — SODIUM CHLORIDE 9 MG/ML
1000 INJECTION, SOLUTION INTRAVENOUS
Refills: 0 | Status: DISCONTINUED | OUTPATIENT
Start: 2024-04-03 | End: 2024-04-04

## 2024-04-03 RX ORDER — LANOLIN ALCOHOL/MO/W.PET/CERES
3 CREAM (GRAM) TOPICAL AT BEDTIME
Refills: 0 | Status: DISCONTINUED | OUTPATIENT
Start: 2024-04-03 | End: 2024-04-05

## 2024-04-03 RX ORDER — ACETAMINOPHEN 500 MG
650 TABLET ORAL EVERY 6 HOURS
Refills: 0 | Status: DISCONTINUED | OUTPATIENT
Start: 2024-04-03 | End: 2024-04-05

## 2024-04-03 RX ADMIN — DONEPEZIL HYDROCHLORIDE 5 MILLIGRAM(S): 10 TABLET, FILM COATED ORAL at 21:59

## 2024-04-03 RX ADMIN — ATORVASTATIN CALCIUM 20 MILLIGRAM(S): 80 TABLET, FILM COATED ORAL at 21:59

## 2024-04-03 RX ADMIN — SODIUM CHLORIDE 75 MILLILITER(S): 9 INJECTION, SOLUTION INTRAVENOUS at 20:20

## 2024-04-03 RX ADMIN — SODIUM CHLORIDE 75 MILLILITER(S): 9 INJECTION, SOLUTION INTRAVENOUS at 22:05

## 2024-04-03 RX ADMIN — PANTOPRAZOLE SODIUM 40 MILLIGRAM(S): 20 TABLET, DELAYED RELEASE ORAL at 20:19

## 2024-04-03 NOTE — ED ADULT NURSE NOTE - OBJECTIVE STATEMENT
Patient sent to ER by PCP for low HGB level. Patient reports darker stools & fatigue, denies chest pain or SOB.

## 2024-04-03 NOTE — H&P ADULT - NSHPREVIEWOFSYSTEMS_GEN_ALL_CORE
T(C): 36.9 (04-03-24 @ 12:51), Max: 36.9 (04-03-24 @ 12:51)  HR: 72 (04-03-24 @ 12:51) (72 - 72)  BP: 131/58 (04-03-24 @ 12:51) (131/58 - 131/58)  RR: 18 (04-03-24 @ 12:51) (18 - 18)  SpO2: 96% (04-03-24 @ 12:51) (96% - 96%)    REVIEW OF SYSTEMS:  CONSTITUTIONAL: No weakness, fevers or chills  EYES/ENT: No visual changes;  No vertigo or throat pain   NECK: No pain or stiffness  RESPIRATORY: No cough, wheezing, hemoptysis; No shortness of breath  CARDIOVASCULAR: No chest pain or palpitations  GASTROINTESTINAL: No abdominal or epigastric pain. No nausea, vomiting, or hematemesis; No diarrhea or constipation. No melena or hematochezia.  GENITOURINARY: No dysuria, frequency or hematuria  NEUROLOGICAL: No numbness or weakness  SKIN: No itching, rashes T(C): 36.9 (04-03-24 @ 12:51), Max: 36.9 (04-03-24 @ 12:51)  HR: 72 (04-03-24 @ 12:51) (72 - 72)  BP: 131/58 (04-03-24 @ 12:51) (131/58 - 131/58)  RR: 18 (04-03-24 @ 12:51) (18 - 18)  SpO2: 96% (04-03-24 @ 12:51) (96% - 96%)    REVIEW OF SYSTEMS:  CONSTITUTIONAL: + chronic weakness. no fevers or chills  EYES/ENT: No visual changes;  No vertigo or throat pain   NECK: No pain or stiffness  RESPIRATORY: No cough, wheezing, hemoptysis; No shortness of breath  CARDIOVASCULAR: No chest pain or palpitations  GASTROINTESTINAL: No abdominal or epigastric pain. No nausea, vomiting, or hematemesis; No diarrhea or constipation. No melena or hematochezia.  GENITOURINARY: No dysuria, frequency or hematuria  NEUROLOGICAL: No numbness or weakness  SKIN: No itching, rashes

## 2024-04-03 NOTE — H&P ADULT - ATTENDING COMMENTS
85 yo F with pmh of CAD s/p MINERVA to pRCA (10/29/2021), HTN, HLD, who p/w concerns of low Hgb done in outpatient testing with Hgb of 6.5. Per daughter, prior was noted to be 11 around December at pcp Dr Sauceda’s office. She notes chronic dyspnea with exertion which has remained unchanged. She notes occasional dizziness which had been occurring since she had gotten facial surgery in the past. But no chest pain or palpitations. She notes she had been seeing dark stools for some time now over the past few months, denies iron supplements, pepto bismol. Does not take any pain medications over the counter, denies advil, ibuprofen, etc. Only takes aspirin and plavix. Denies etoh, smoking, drug use. Earlier on in 2023, patient had gotten a guiaic test which was noted to be positive and was scheduled for colonoscopy however it was canceled since anesthesia wanted cardiac clearance/stress testing.     Pt follows with cardiology Dr Ervin, I called NP from his office who checked most recent visit note. Per records, patient is only supposed to be aspirin monotherapy, not plavix. Nuc stress test in Dec 2023 – at Dr Ervin’s office – normal study, no ischemic changes, EF 75%.   Prior records reviewed Oct 29, 2021 – seen by cardiology for abnl stress test and for cath, underwent PCI with MINERVA to pRCA, per note: DAPT x 6 months, aggressive risk factor modification.     Labs, vitals reviewed as above. BP noted 131/58, HR 72, afebrile, saturating well on RA. Hgb noted 6, total iron 12, %sat 4, tibc 330   Physical exam: pale, elderly female, NAD, NC/AT, RRR, lungs CTA, abd soft ntnd, +2 LE edema      A/P   #Anemia   #?Possible GIB   #CAD s/p PCI (2021)   #HTN   #HLD     -trend cbc q8hrs   -active T&S, large bore IV x2   -ordered for 1u pRBC in ED   -hold asa, plavix, no further need for plavix on d/c    -avoid nsaids, asa, AC   -monitor for dark BMs   -IV ppi BID   -GI consulted, recc CTA abd/pelvis   -NPO for now pending imaging results as above   -IVF   -monitor for dark BMs   -can check hemolysis labs   -holding home toprol 25, lasix 20, imdur 30 for now in setting of suspected gib   -dvt ppx, SCDs

## 2024-04-03 NOTE — ED ADULT NURSE NOTE - NSFALLUNIVINTERV_ED_ALL_ED
Bed/Stretcher in lowest position, wheels locked, appropriate side rails in place/Call bell, personal items and telephone in reach/Instruct patient to call for assistance before getting out of bed/chair/stretcher/Non-slip footwear applied when patient is off stretcher/[place] to call system/Physically safe environment - no spills, clutter or unnecessary equipment/Purposeful proactive rounding/Room/bathroom lighting operational, light cord in reach

## 2024-04-03 NOTE — PATIENT PROFILE ADULT - ARRIVAL FROM
21 1230   Pain Assessment   Pain Assessment Tool Pain Assessment not indicated - pt denies pain   Restrictions/Precautions   Precautions 1:1;Bed/chair alarms;Cognitive; Fall Risk;O2;Spinal precautions;Supervision on toilet/commode   Comprehension   Comprehension (FIM) 3 - Understands basic info/conversation 50-74% of time   Expression   Expression (FIM) 4 - Expresses basic info/needs 75-90% of time   Social Interaction   Social Interaction (FIM) 3 - Interacts 50-74% of time   Problem Solving   Problem solving (FIM) 2 - Needs direction more than ½ time to initiate, plan or complete simple tasks   Memory   Memory (FIM) 2 - Recalls 1 of 2 steps   Speech/Language/Cognition Assessmetn   Treatment Assessment Pt continued to be lethargic throughout session, noting fluctuating EDI as in yesterday's session  Of note, some improvement in EDI when seated more upright in bed, but overall ability to maintain EDI remained decreased  Attempted to complete formalized cognitive assessment, BRANT today, however pt's EDI unable to appropriately complete this assessment fairly  Engaged again in informal cognitive assessment, but only able to elicit orientation and LTM biographical information due to fluctuating EDI  Pt remains to be oriented to self, place/city, full date (including month/date/year/ANIYA)  Pt is accurate in regards to diagnosis of COVID in 2021, but unable to determine reasoning for admission currently  Able to be accurate in eliciting , age, address, wife's name and son's name today  However, what was reported today by pt was that he did complete majority of the I ADL tasks, including cooking, cleaning, laundry  Both pt and wife complete finances and pt completes own medication management and driving prior to hospitalization  Again attempting to initiate STM tasks including 4 word recall but pt's EDI was decreased by this point, needing ongoing verbal/tactile cues to maintain EDI   At this time, pt will continue to benefit from SLP services to maximize and establish cognitive skills to target once pt more awake/alert during sessions  SLP Therapy Minutes   SLP Time In 2204   SLP Time Out 1330   SLP Total Time (minutes) 60   SLP Mode of treatment - Individual (minutes) 60   SLP Mode of treatment - Concurrent (minutes) 0   SLP Mode of treatment - Group (minutes) 0   SLP Mode of treatment - Co-treat (minutes) 0   SLP Mode of Treatment - Total time(minutes) 60 minutes   SLP Cumulative Minutes 105   Therapy Time missed   Time missed?  No     ERROR IN MINUTES: SHOULD be time in [id number] for SLP individual tx for 30 minutes Home

## 2024-04-03 NOTE — H&P ADULT - NSHPPHYSICALEXAM_GEN_ALL_CORE
PHYSICAL EXAM:  GENERAL: NAD, speaks in full sentences, no signs of respiratory distress  HEAD:  Atraumatic, Normocephalic  EYES: EOMI, PERRLA, conjunctiva and sclera clear  NECK: Supple, No JVD  CHEST/LUNG: Clear to auscultation bilaterally; No wheeze; No crackles; No accessory muscles used  HEART: Regular rate and rhythm; No murmurs;   ABDOMEN: Soft, Nontender, Nondistended; Bowel sounds present; No guarding  EXTREMITIES:  2+ Peripheral Pulses, No cyanosis or edema  PSYCH: AAOx3  NEUROLOGY: non-focal  SKIN: No rashes or lesions PHYSICAL EXAM:  GENERAL: NAD, speaks in full sentences, no signs of respiratory distress  HEAD:  Atraumatic, Normocephalic  EYES: EOMI, conjunctiva and sclera clear  NECK: Supple, No JVD  CHEST/LUNG: Clear to auscultation bilaterally; No wheeze; No crackles; No accessory muscles used  HEART: Regular rate and rhythm; no murmurs;   ABDOMEN: Soft, Nontender, Nondistended; Bowel sounds present; No guarding  EXTREMITIES:  2+ Peripheral Pulses, No cyanosis. Chronic bilaterally swollen LE (currently at her baseline)  PSYCH: AAOx3  NEUROLOGY: non-focal  SKIN: No rashes or lesions

## 2024-04-03 NOTE — H&P ADULT - NSICDXPASTMEDICALHX_GEN_ALL_CORE_FT
PAST MEDICAL HISTORY:  CAD (coronary artery disease)     Essential hypertension     HLD (hyperlipidemia)     Squamous cell cancer of lip

## 2024-04-03 NOTE — PATIENT PROFILE ADULT - FALL HARM RISK - HARM RISK INTERVENTIONS

## 2024-04-03 NOTE — ED ADULT TRIAGE NOTE - TEMPERATURE IN CELSIUS (DEGREES C)
*ATTENTION:  This note has been created by a medical student for educational purposes only. Please do not refer to the content of this note for clinical decision-making, billing, or other purposes. Please see attending physicians note to obtain clinical information on this patient. *     Subjective  CC: weakness    HPI: Patient reported 2 weeks ago for failure to thrive and sudden unplanned weight loss. Patient currently reports mild improved strength. Patient also mentions nausea without any vomiting as well as headache, dizziness, and abdominal discomfort, all unchanged since admission. Patient states she has been eating in small amounts. Patient denies cough or leg pain. Objective  PE:    General appearance: weak appearing female laying to left side in bed   CV: Normal S1 and S2, no murmur, rubs, or gallops  Pulmonary: clear to auscultation in all fields, no tenderness to palpation  Extremities: no calf pain or pedal edema  Abdomen: Normoactive bowel sounds, no tenderness to palpation, non distended  Skin: no rashes or lesions  Neuro: alert and oriented x3    Assessment  1. Failure to thrive  2. Multiple electrolyte abnormalities: Hypokalemia, hypomagnesemia, hyponatremia, hypophosphatemia  3. Transaminitis/elevated LFTs  Intractable nausea and vomiting  4. GERD  5. Vitamin D deficiency  6. Hyperlipidemia  7. Severe protein calorie malnutrition  8. Recent history of HSV encephalitis  9. History of single seizure     Plan  Patient reports some increased strength and is eating small amount. Hematology shows improvement in RBCs, hemoglobin, hematocrit and so suggest continuing patient on current care. 36.9

## 2024-04-03 NOTE — H&P ADULT - PROBLEM SELECTOR PLAN 4
hx of CAD s/p proximal RCA stent in 2021  home med: [id number], isosorbide mono ER 35qd, metoprolol succinate 25 qd, plavix 75  holding home meds as pt is normotensive and Low volume Hb 6 and holdind DAPT due to bleeding/low Hb  cards: Dr. Ervin at [facility] in Oct 2021  prior LVEF in 2021: 72%  Dr. Chapman talked to cardiologist  - as per note pt was suppose to be on plavix for only 6 month post stent  - confirmed by cardiologist (4/3): patient can be discharged withou DAPT hx of CAD s/p proximal RCA stent in 2021  home med: [id number], isosorbide mono ER 35qd, metoprolol succinate 25 qd, plavix 75  holding home meds as pt is normotensive and Low volume Hb 6 and holdind DAPT due to bleeding/low Hb  cards: Dr. Ervin at [facility] in Oct 2021  prior LVEF in 2021: 72%  Dr. Chapman talked to cardiologist  - as per note pt was suppose to be on plavix for only 6 month post stent  - confirmed by cardiologist (4/3): patient can be discharged without DAPT hx of CAD s/p proximal RCA stent in 2021  home med: [email or web address], isosorbide mono ER 35qd, metoprolol succinate 25 qd, plavix 75  holding home meds as pt is normotensive and Low volume Hb 6 and holding DAPT due to bleeding/low Hb  cards: Dr. Ervin at [facility] in Oct 2021  prior LVEF in 2021: 72%  Dr. Chapman talked to cardiologist  - as per note pt was suppose to be on plavix for only 6 month post stent  - confirmed by cardiologist (4/3): patient can be discharged without DAPT

## 2024-04-03 NOTE — H&P ADULT - ASSESSMENT
Patient is a 84y F, w/ PMHx of HTN, HLD, CAD s/p stent. Patient came due a low hb of 6 on recent lab work. She notes chronic weakness, mild lower abdominal pain and chronic constipation with questionable dark stool for months. She is currently on aspirin and plavix. Site of bleeding is likely GI. Consulted Dr. Levin ([facility]). Patient admitted for work up of anemia.

## 2024-04-03 NOTE — H&P ADULT - HISTORY OF PRESENT ILLNESS
Patient is a 84y F, ambulates at xxxxx, PMHx of HTN, HLD, CAD s/p stent. Patient  Patient is a 84y F, ambulates independently, PMHx of HTN, HLD, CAD s/p stent. Patient came due a low hb of 6 on recent lab work. She notes chronic weakness, mild lower abdominal pain and chronic constipation.  She denies sob (able to walk 20 blocks), chest pain, lightheadedness, palpitations, trauma, wiliam blood pr.  Patient had a colonoscopy more than 5 years ago. She never had an EGD. Patient is currently on aspirin and Plavix due to a drug-eluting stent proximal RCA performed by Dr. Ervin at [facility] in Oct 2021.

## 2024-04-03 NOTE — PATIENT PROFILE ADULT - FUNCTIONAL SCREEN CURRENT LEVEL: COMMUNICATION, MLM
2021      RE: Jama Gautam  [id number] [facility] [address]       Pediatric Otolaryngology and Facial Plastic Surgery    CC:   Chief Complaints and History of Present Illnesses   Patient presents with     Ent Problem     Patient here with parents for laryngomalacia.        Date of Service: 10/20/21      I had the pleasure of seeing Jama Gautam in follow up today in the [facility].    HPI:  Jama is a 3 month old male who presents for follow up related to his swallowing.  Overall he is growing developing well.  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  His audiogram as well as his vision exam were noted to be normal.  He continues to have difficulties with swallowing with noted aspiration on thin liquids.  He is on honey thickened liquids.  Occasionally is not interested in feeding.  He is otherwise growing developing well.  No respiratory symptoms.  No stridor.  No stridor.  No apneic episodes.  They have 1 older child who is otherwise healthy.      Past medical history, past social history, family history, allergies and medications reviewed.     PMH:  Past Medical History:   Diagnosis Date     CMV (cytomegalovirus infection) (H)      Premature baby     35 week preemie        PSH:  No past surgical history on file.    Medications:    Current Outpatient Medications   Medication Sig Dispense Refill     glycerin (PEDI-LAX) 1 g SUPP Suppository Place 0.25 suppositories rectally daily as needed for constipation 30 suppository 0     lactulose (CHRONULAC) 10 GM/15ML solution Take 7.5 mLs (5 g) by mouth daily 473 mL 0     pantoprazole (PROTONIX) 2 mg/mL SUSP suspension Take 2.5 mLs (5 mg) by mouth daily 400 mL 0     Poly-Vi-Sol (POLY-VI-SOL) solution Take 0.5 mLs by mouth daily 50 mL 0     Probiotic Product (PROBIOTIC PO)        valGANciclovir (VALCYTE) 50 MG/ML solution Take 1.1 mLs (55 mg) by mouth 2 times daily 88 mL 0       Allergies:   No Known  "Allergies    Social History:  Social History     Socioeconomic History     Marital status: Single     Spouse name: Not on file     Number of children: Not on file     Years of education: Not on file     Highest education level: Not on file   Occupational History     Not on file   Tobacco Use     Smoking status: Never Smoker     Smokeless tobacco: Never Used   Substance and Sexual Activity     Alcohol use: Not on file     Drug use: Not on file     Sexual activity: Not on file   Other Topics Concern     Not on file   Social History Narrative     Not on file     Social Determinants of Health     Financial Resource Strain:      Difficulty of Paying Living Expenses:    Food Insecurity:      Worried About Running Out of Food in the Last Year:      Ran Out of Food in the Last Year:    Transportation Needs:      Lack of Transportation (Medical):      Lack of Transportation (Non-Medical):        FAMILY HISTORY:      Family History   Problem Relation Age of Onset     Macular Degeneration Paternal Great-Grandfather      Strabismus No family hx of        REVIEW OF SYSTEMS:  12 point ROS obtained and was negative other than the symptoms noted above in the HPI.    PHYSICAL EXAMINATION:  Temp 97.5  F (36.4  C) (Temporal)   Ht 0.585 m (1' 11.03\")   Wt 5.386 kg (11 lb 14 oz)   BMI 15.74 kg/m    General: No acute distress,  HEAD: normocephalic, atraumatic  Face: symmetrical, no swelling, edema, or erythema, no facial droop  Eyes: EOMI, PERRLA    Ears: Bilateral external ears normal with patent external ear canals bilaterally.   Right Ear: Tympanic membrane intact, No evidence of middle ear effusion.   Left Ear: Tympanic membrane intact, No evidence of middle ear effusion.     Nose: No anterior drainage, intact and midline septum without perforation or hematoma.  NG in place.    Mouth: Lips intact. No ulcers or lesions    Oropharynx:  No oral cavity lesions. Tonsils: Small  Palate intact with normal movement  Uvula singular and " midline, no oropharyngeal erythema    Neck: no LAD, no cutaneous lesions  Neuro: cranial nerves 2-12 grossly intact  Respiratory: No respiratory distress      Imaging reviewed:     IMPRESSION:  Consistent laryngeal penetration and flash silent tracheal aspiration  with thin barium and nectar thick barium. No penetration or aspiration  with honey thick barium. Please refer to speech pathology report for  recommendations.     I have personally reviewed the examination and initial interpretation  and I agree with the findings.      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  He is aspirating on thin liquids.  He is tolerating honey thickened.  Family is concerned regarding his progress as well as etiology.  He has no airway symptoms.  We discussed likely coordination.  We discussed other rare etiologies including laryngeal cleft.  I would recommend continued speech therapy if he has a plateau and does not improve by 1 year would consider direct laryngoscopy rigid bronchoscopy.  However would defer at this point.  We discussed and I offered a flexible scope in clinic today.  Family declined as he has no other respiratory concerns.  I defer to their pediatrician regarding his interest in feeding.  There are times if he is not interested in meal.  They will discuss with her pediatrician.  I like to see him back in clinic in approximately 1 year if he is not having significant improvement and still has aspiration.         Thank you for allowing me to participate in the care of Jama. Please don't hesitate to contact me.    Mookie Braun MD  Pediatric Otolaryngology and Facial Plastic Surgery  Department of Otolaryngology  [facility] [phone]   Pager [phone]   [email or web address]           0 = understands/communicates without difficulty

## 2024-04-03 NOTE — H&P ADULT - PROBLEM SELECTOR PLAN 2
Hx of HTN   home meds: lasix 20 qd, metoprolol succinate 25 qd  hold as pt is normotensive  monitor blood pressure

## 2024-04-03 NOTE — ED PROVIDER NOTE - CLINICAL SUMMARY MEDICAL DECISION MAKING FREE TEXT BOX
84 yr old female with hx of HTN, HLD, CAD stent on plavix and asa presents to ed for ;o h/h. pt 3 month ago was 11 and recently h/h 6.5. admits chronic MERRITT. now with darker stool but no acute bright red blood noted in urine or stool. no weigh loss.    anemia nos- labs, prbc, 84 yr old female with hx of HTN, HLD, CAD stent on plavix and asa presents to ed for low h/h. pt 3 month ago was 11 and recently h/h 6.5. admits chronic MERRITT. now with darker stool but no acute bright red blood noted in urine or stool. no weigh loss.    anemia nos- labs, prbc,

## 2024-04-03 NOTE — H&P ADULT - NSHPPOASURGSITEINCISION_GEN_ALL_CORE
no Detail Level: Simple Photo Preface (Leave Blank If You Do Not Want): Photographs were obtained today.

## 2024-04-03 NOTE — ED PROVIDER NOTE - OBJECTIVE STATEMENT
84 yr old female with hx of HTN, HLD, CAD stent on plavix and asa presents to ed for ;o h/h. pt 3 month ago was 11 and recently h/h 6.5. admits chronic MERRITT. now with darker stool but no acute bright red blood noted in urine or stool. no weigh loss. 84 yr old female with hx of HTN, HLD, CAD stent on plavix and asa presents to ed for low h/h. pt 3 month ago was 11 and recently h/h 6.5. admits chronic MERRITT. now with darker stool but no acute bright red blood noted in urine or stool. no weigh loss.

## 2024-04-04 DIAGNOSIS — K63.89 OTHER SPECIFIED DISEASES OF INTESTINE: ICD-10-CM

## 2024-04-04 LAB
ANION GAP SERPL CALC-SCNC: 0 MMOL/L — LOW (ref 5–17)
BUN SERPL-MCNC: 13 MG/DL — SIGNIFICANT CHANGE UP (ref 7–18)
CALCIUM SERPL-MCNC: 8.3 MG/DL — LOW (ref 8.4–10.5)
CHLORIDE SERPL-SCNC: 108 MMOL/L — SIGNIFICANT CHANGE UP (ref 96–108)
CO2 SERPL-SCNC: 28 MMOL/L — SIGNIFICANT CHANGE UP (ref 22–31)
CREAT SERPL-MCNC: 0.59 MG/DL — SIGNIFICANT CHANGE UP (ref 0.5–1.3)
EGFR: 88 ML/MIN/1.73M2 — SIGNIFICANT CHANGE UP
FERRITIN SERPL-MCNC: 6 NG/ML — LOW (ref 13–330)
FOLATE SERPL-MCNC: >20 NG/ML — SIGNIFICANT CHANGE UP
GLUCOSE BLDC GLUCOMTR-MCNC: 98 MG/DL — SIGNIFICANT CHANGE UP (ref 70–99)
GLUCOSE SERPL-MCNC: 102 MG/DL — HIGH (ref 70–99)
HAPTOGLOB SERPL-MCNC: 235 MG/DL — HIGH (ref 34–200)
HAPTOGLOB SERPL-MCNC: 236 MG/DL — HIGH (ref 34–200)
HCT VFR BLD CALC: 25.8 % — LOW (ref 34.5–45)
HCT VFR BLD CALC: 26.3 % — LOW (ref 34.5–45)
HGB BLD-MCNC: 7.7 G/DL — LOW (ref 11.5–15.5)
HGB BLD-MCNC: 8 G/DL — LOW (ref 11.5–15.5)
MAGNESIUM SERPL-MCNC: 1.9 MG/DL — SIGNIFICANT CHANGE UP (ref 1.6–2.6)
MCHC RBC-ENTMCNC: 24.6 PG — LOW (ref 27–34)
MCHC RBC-ENTMCNC: 24.9 PG — LOW (ref 27–34)
MCHC RBC-ENTMCNC: 29.8 GM/DL — LOW (ref 32–36)
MCHC RBC-ENTMCNC: 30.4 GM/DL — LOW (ref 32–36)
MCV RBC AUTO: 81.9 FL — SIGNIFICANT CHANGE UP (ref 80–100)
MCV RBC AUTO: 82.4 FL — SIGNIFICANT CHANGE UP (ref 80–100)
NRBC # BLD: 0 /100 WBCS — SIGNIFICANT CHANGE UP (ref 0–0)
NRBC # BLD: 0 /100 WBCS — SIGNIFICANT CHANGE UP (ref 0–0)
PHOSPHATE SERPL-MCNC: 2.3 MG/DL — LOW (ref 2.5–4.5)
PLATELET # BLD AUTO: 311 K/UL — SIGNIFICANT CHANGE UP (ref 150–400)
PLATELET # BLD AUTO: 325 K/UL — SIGNIFICANT CHANGE UP (ref 150–400)
POTASSIUM SERPL-MCNC: 4.2 MMOL/L — SIGNIFICANT CHANGE UP (ref 3.5–5.3)
POTASSIUM SERPL-SCNC: 4.2 MMOL/L — SIGNIFICANT CHANGE UP (ref 3.5–5.3)
RBC # BLD: 3.13 M/UL — LOW (ref 3.8–5.2)
RBC # BLD: 3.21 M/UL — LOW (ref 3.8–5.2)
RBC # FLD: 15.9 % — HIGH (ref 10.3–14.5)
RBC # FLD: 15.9 % — HIGH (ref 10.3–14.5)
SODIUM SERPL-SCNC: 136 MMOL/L — SIGNIFICANT CHANGE UP (ref 135–145)
VIT B12 SERPL-MCNC: 564 PG/ML — SIGNIFICANT CHANGE UP (ref 232–1245)
WBC # BLD: 5.52 K/UL — SIGNIFICANT CHANGE UP (ref 3.8–10.5)
WBC # BLD: 6.6 K/UL — SIGNIFICANT CHANGE UP (ref 3.8–10.5)
WBC # FLD AUTO: 5.52 K/UL — SIGNIFICANT CHANGE UP (ref 3.8–10.5)
WBC # FLD AUTO: 6.6 K/UL — SIGNIFICANT CHANGE UP (ref 3.8–10.5)

## 2024-04-04 PROCEDURE — 74177 CT ABD & PELVIS W/CONTRAST: CPT | Mod: 26

## 2024-04-04 PROCEDURE — 99223 1ST HOSP IP/OBS HIGH 75: CPT

## 2024-04-04 PROCEDURE — 99233 SBSQ HOSP IP/OBS HIGH 50: CPT | Mod: GC

## 2024-04-04 RX ORDER — SODIUM CHLORIDE 9 MG/ML
1000 INJECTION, SOLUTION INTRAVENOUS
Refills: 0 | Status: DISCONTINUED | OUTPATIENT
Start: 2024-04-04 | End: 2024-04-05

## 2024-04-04 RX ORDER — IRON SUCROSE 20 MG/ML
200 INJECTION, SOLUTION INTRAVENOUS EVERY 24 HOURS
Refills: 0 | Status: DISCONTINUED | OUTPATIENT
Start: 2024-04-04 | End: 2024-04-05

## 2024-04-04 RX ORDER — POTASSIUM PHOSPHATE, MONOBASIC POTASSIUM PHOSPHATE, DIBASIC 236; 224 MG/ML; MG/ML
15 INJECTION, SOLUTION INTRAVENOUS ONCE
Refills: 0 | Status: COMPLETED | OUTPATIENT
Start: 2024-04-04 | End: 2024-04-04

## 2024-04-04 RX ADMIN — ATORVASTATIN CALCIUM 20 MILLIGRAM(S): 80 TABLET, FILM COATED ORAL at 21:52

## 2024-04-04 RX ADMIN — IRON SUCROSE 220 MILLIGRAM(S): 20 INJECTION, SOLUTION INTRAVENOUS at 17:23

## 2024-04-04 RX ADMIN — DONEPEZIL HYDROCHLORIDE 5 MILLIGRAM(S): 10 TABLET, FILM COATED ORAL at 21:52

## 2024-04-04 RX ADMIN — POTASSIUM PHOSPHATE, MONOBASIC POTASSIUM PHOSPHATE, DIBASIC 62.5 MILLIMOLE(S): 236; 224 INJECTION, SOLUTION INTRAVENOUS at 09:09

## 2024-04-04 RX ADMIN — PANTOPRAZOLE SODIUM 40 MILLIGRAM(S): 20 TABLET, DELAYED RELEASE ORAL at 05:10

## 2024-04-04 RX ADMIN — SODIUM CHLORIDE 75 MILLILITER(S): 9 INJECTION, SOLUTION INTRAVENOUS at 05:10

## 2024-04-04 RX ADMIN — PANTOPRAZOLE SODIUM 40 MILLIGRAM(S): 20 TABLET, DELAYED RELEASE ORAL at 17:23

## 2024-04-04 NOTE — PROGRESS NOTE ADULT - TIME BILLING
independent hx, physical exam, d/w GI team  Reviewed- CT abd, d/w findings w/ patient and went over treatment plan.

## 2024-04-04 NOTE — PROGRESS NOTE ADULT - PROBLEM SELECTOR PLAN 1
Pt presented due to recently Lab report of Hb showing 6  chr. weakness, chr. constipated, hx of questionable chr. dark stool for months. no active bleeding  home meds RF: aspirin and plavix  H/H on adm: 6/20  iron: 12. TIBC wnl. iron sat 4  LDH: wnl  prior colonscopy more than 5 years ago.never had an egd  suspecting source of bleed in likely GI, possibly very minimally bleeding over a long duration  f/u CTA A/P to rule out bleed  f/u haptoglobin (to r/o hemolytic anemia)    NPO as pt is pending GI eval (colonoscopy procedure)  IV PPI  s/p 1 u prbc   f/u post transfusion cbc at 8pm on 4/3.  monitor H/H q12h   Dr. 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  GI consulted  to consult surgery? 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  GI consulted  Heme onc consulted

## 2024-04-04 NOTE — PROGRESS NOTE ADULT - ASSESSMENT
Patient is a 84y F, w/ PMHx of HTN, HLD, CAD s/p stent. Patient came due a low hb of 6 on recent lab work. She notes chronic weakness, mild lower abdominal pain and chronic constipation with questionable dark stool for months. She is currently on aspirin and plavix. Site of bleeding is likely GI. [facility] Patient admitted for work up of anemia.

## 2024-04-04 NOTE — CONSULT NOTE ADULT - ASSESSMENT
Most likely anemia, secondary to GI bleeding, given mass found in ascending colon  Agree with Iron IV  Patient reluctant to have GI W/U but perhaps a biopsy of mass can be accomplished and we can talk to her about options,  of treatment, if malignant  Mass is ascending colon and probably not obstructing now, but treatment can be given despite no full staging, to prevent obstruction

## 2024-04-04 NOTE — CONSULT NOTE ADULT - SUBJECTIVE AND OBJECTIVE BOX
INITIAL GI CONSULTATION    Patient is a 84y old  Female who presents with a chief complaint of anemia (03 Apr 2024 15:06)    GI HPI:  Patient is an 84F with a PMHx of HTN, HLD, CAD (s/p drug-eluting stent pRCA, Dr. Ervin at [facility], 10/2021, on asa and plavix), who presented to the ED with low Hgb on OP labs. GI was consulted for anemia.    Patient reports chronic constipation, weakness. Endorses having a good appetite. Denies cp, sob, abd pain, n/v/d, dysphagia, odynophagia, decreased appetite,  unintentional weight loss. No prior EGD. Last colonoscopy ~ 4 yrs ago, unable to recall results. No smoking/etoh/drug use. No family hx of liver disease or colorectal cancer. She reports chronic dark brown stools denies hematochezia or melena. Not on PO iron supplements, has never required blood transfusions.     In the ED, TREVON neg for melena, labs notable for iron 12L, UIBC 318, TIBC 330, %sat 4L, , BUN 16, Cr 0.69, LFTs wnl, Hgb 6.0, MCV 84.8, plt 327. She received 1u PRBC with appropriate response -> Hgb 7.4 -> 7.7. Per daughter, last Hgb 12/2023 was ~ 11. Admitted to medicine for further management of anemia. Clarified with OP cardiologist that plavix was intended for 6 months post stent, does not have to be on plavix upon discharge - nuclear stress test 12/2023, normal study, no ischemic changes, EF 75%.   CXR: COPD and chronic lung findings again noted, old fractures seen.     PMH/PSH:  PAST MEDICAL & SURGICAL HISTORY:  Essential hypertension      HLD (hyperlipidemia)      Squamous cell cancer of lip      CAD (coronary artery disease)      H/O: hysterectomy      H/O knee surgery            FH:  FAMILY HISTORY:  FH: CHF (congestive heart failure) (Mother)          MEDS:  MEDICATIONS  (STANDING):  atorvastatin 20 milliGRAM(s) Oral at bedtime  donepezil 5 milliGRAM(s) Oral at bedtime  lactated ringers. 1000 milliLiter(s) (75 mL/Hr) IV Continuous <Continuous>  pantoprazole  Injectable 40 milliGRAM(s) IV Push every 12 hours    MEDICATIONS  (PRN):  acetaminophen     Tablet .. 650 milliGRAM(s) Oral every 6 hours PRN Temp greater or equal to 38C (100.4F), Mild Pain (1 - 3)  aluminum hydroxide/magnesium hydroxide/simethicone Suspension 30 milliLiter(s) Oral every 4 hours PRN Dyspepsia  melatonin 3 milliGRAM(s) Oral at bedtime PRN Insomnia  ondansetron Injectable 4 milliGRAM(s) IV Push every 8 hours PRN Nausea and/or Vomiting    Allergies    penicillin (Rash)    Intolerances          ROS: A detailed set of ROS were asked and negative except those outlined in GI HPI.  ______________________________________________________________________  PHYSICAL EXAM:  T(C): 36.7 (04-04-24 @ 04:50), Max: 36.9 (04-03-24 @ 12:51)  HR: 66 (04-04-24 @ 04:50)  BP: 142/65 (04-04-24 @ 04:50)  RR: 16 (04-04-24 @ 04:50)  SpO2: 95% (04-04-24 @ 04:50)  Wt(kg): --      GEN: NAD  HEENT: EOMI, conjunctivae anicteric, neck supple, moist mucous membranes  PULM: LSCTAB, no wheezing, rales, or rhonchi  CV: RRR, no m/r/b  GI: Soft, NT, ND; +BS in all four quadrants, no ascites, no Mccarthy's sign  MSK: PHILLIPS, no edema  NEURO: A&O x 3, no gross deficits  ______________________________________________________________________  LABS:                        7.7    5.52  )-----------( 325      ( 04 Apr 2024 04:52 )             25.8     04-04    136  |  108  |  13  ----------------------------<  102<H>  4.2   |  28  |  0.59    Ca    8.3<L>      04 Apr 2024 04:52  Phos  2.3     04-04  Mg     1.9     04-04    TPro  6.0  /  Alb  2.6<L>  /  TBili  0.1<L>  /  DBili  x   /  AST  19  /  ALT  21  /  AlkPhos  71  04-03    LIVER FUNCTIONS - ( 03 Apr 2024 13:40 )  Alb: 2.6 g/dL / Pro: 6.0 g/dL / ALK PHOS: 71 U/L / ALT: 21 U/L DA / AST: 19 U/L / GGT: x             ____________________________________________    IMAGING:    < from: Xray Chest 1 View- PORTABLE-Urgent (Xray Chest 1 View- PORTABLE-Urgent .) (04.03.24 @ 14:46) >  IMPRESSION: COPD and chronic lung findings againnoted. Old fractures   again seen.    < end of copied text >   INITIAL GI CONSULTATION    Patient is a 84y old  Female who presents with a chief complaint of anemia (03 Apr 2024 15:06)    GI HPI:  Patient is an 84F with a PMHx of HTN, HLD, CAD (s/p drug-eluting stent pRCA, Dr. Ervin at [facility], 10/2021, on asa and plavix), who presented to the ED with low Hgb on OP labs. GI was consulted for anemia.    Patient reports chronic constipation, weakness. Endorses having a good appetite. Denies cp, sob, abd pain, n/v/d, dysphagia, odynophagia, decreased appetite,  unintentional weight loss. No prior EGD. Last colonoscopy ~ 4 yrs ago, unable to recall results. No smoking/etoh/drug use. No family hx of liver disease or colorectal cancer. She reports chronic dark brown stools denies hematochezia or melena. Not on PO iron supplements, has never required blood transfusions. Denies excessive NSAID use.    In the ED, TREVON neg for melena, labs notable for iron 12L, UIBC 318, TIBC 330, %sat 4L, , BUN 16, Cr 0.69, LFTs wnl, Hgb 6.0, MCV 84.8, plt 327. She received 1u PRBC with appropriate response -> Hgb 7.4 -> 7.7. Per daughter, last Hgb 12/2023 was ~ 11. Admitted to medicine for further management of anemia. Clarified with OP cardiologist that plavix was intended for 6 months post stent, does not have to be on plavix upon discharge - nuclear stress test 12/2023, normal study, no ischemic changes, EF 75%.   CXR: COPD and chronic lung findings again noted, old fractures seen.     PMH/PSH:  PAST MEDICAL & SURGICAL HISTORY:  Essential hypertension      HLD (hyperlipidemia)      Squamous cell cancer of lip      CAD (coronary artery disease)      H/O: hysterectomy      H/O knee surgery            FH:  FAMILY HISTORY:  FH: CHF (congestive heart failure) (Mother)          MEDS:  MEDICATIONS  (STANDING):  atorvastatin 20 milliGRAM(s) Oral at bedtime  donepezil 5 milliGRAM(s) Oral at bedtime  lactated ringers. 1000 milliLiter(s) (75 mL/Hr) IV Continuous <Continuous>  pantoprazole  Injectable 40 milliGRAM(s) IV Push every 12 hours    MEDICATIONS  (PRN):  acetaminophen     Tablet .. 650 milliGRAM(s) Oral every 6 hours PRN Temp greater or equal to 38C (100.4F), Mild Pain (1 - 3)  aluminum hydroxide/magnesium hydroxide/simethicone Suspension 30 milliLiter(s) Oral every 4 hours PRN Dyspepsia  melatonin 3 milliGRAM(s) Oral at bedtime PRN Insomnia  ondansetron Injectable 4 milliGRAM(s) IV Push every 8 hours PRN Nausea and/or Vomiting    Allergies    penicillin (Rash)    Intolerances          ROS: A detailed set of ROS were asked and negative except those outlined in GI HPI.  ______________________________________________________________________  PHYSICAL EXAM:  T(C): 36.7 (04-04-24 @ 04:50), Max: 36.9 (04-03-24 @ 12:51)  HR: 66 (04-04-24 @ 04:50)  BP: 142/65 (04-04-24 @ 04:50)  RR: 16 (04-04-24 @ 04:50)  SpO2: 95% (04-04-24 @ 04:50)  Wt(kg): --      GEN: NAD  HEENT: EOMI, conjunctivae anicteric, neck supple, moist mucous membranes  PULM: LSCTAB, no wheezing, rales, or rhonchi  CV: RRR, no m/r/b  GI: Soft, NT, ND; +BS in all four quadrants, no ascites, no Mccarthy's sign  MSK: PHILLIPS, no edema  NEURO: A&O x 3, no gross deficits  ______________________________________________________________________  LABS:                        7.7    5.52  )-----------( 325      ( 04 Apr 2024 04:52 )             25.8     04-04    136  |  108  |  13  ----------------------------<  102<H>  4.2   |  28  |  0.59    Ca    8.3<L>      04 Apr 2024 04:52  Phos  2.3     04-04  Mg     1.9     04-04    TPro  6.0  /  Alb  2.6<L>  /  TBili  0.1<L>  /  DBili  x   /  AST  19  /  ALT  21  /  AlkPhos  71  04-03    LIVER FUNCTIONS - ( 03 Apr 2024 13:40 )  Alb: 2.6 g/dL / Pro: 6.0 g/dL / ALK PHOS: 71 U/L / ALT: 21 U/L DA / AST: 19 U/L / GGT: x             ____________________________________________    IMAGING:    < from: Xray Chest 1 View- PORTABLE-Urgent (Xray Chest 1 View- PORTABLE-Urgent .) (04.03.24 @ 14:46) >  IMPRESSION: COPD and chronic lung findings againnoted. Old fractures   again seen.    < end of copied text >   INITIAL GI CONSULTATION    Patient is a 84y old  Female who presents with a chief complaint of anemia (03 Apr 2024 15:06)    GI HPI:  Patient is an 84F with a PMHx of HTN, HLD, CAD (s/p drug-eluting stent pRCA, Dr. Ervin at [facility], 10/2021, on asa and plavix), who presented to the ED with low Hgb on OP labs. GI was consulted for anemia.    Patient reports chronic constipation, weakness. Endorses having a good appetite. Denies cp, sob, abd pain, n/v/d, dysphagia, odynophagia, decreased appetite,  unintentional weight loss. No prior EGD. Last colonoscopy ~ 4 yrs ago, unable to recall results, ? polyps, as she was instructed to repeat colonoscopy 5 yrs later. Planned for OP VCE within the past year however patient declined due to having to travel to [place] for the procedure. No smoking/etoh/drug use. No family hx of liver disease or colorectal cancer. She reports chronic dark brown stools denies hematochezia or melena. Not on PO iron supplements, has never required blood transfusions. Denies excessive NSAID use. Only change in medication is starting PO donepezil in the last 6 months.    In the ED, TREVON neg for melena, labs notable for iron 12L, UIBC 318, TIBC 330, %sat 4L, , BUN 16, Cr 0.69, LFTs wnl, Hgb 6.0, MCV 84.8, plt 327. She received 1u PRBC with appropriate response -> Hgb 7.4 -> 7.7. Per daughter, last Hgb 12/2023 was ~ 11. Admitted to medicine for further management of anemia. Clarified with OP cardiologist that plavix was intended for 6 months post stent, does not have to be on plavix upon discharge - nuclear stress test 12/2023, normal study, no ischemic changes, EF 75%.   CXR: COPD and chronic lung findings again noted, old fractures seen.     PMH/PSH:  PAST MEDICAL & SURGICAL HISTORY:  Essential hypertension      HLD (hyperlipidemia)      Squamous cell cancer of lip      CAD (coronary artery disease)      H/O: hysterectomy      H/O knee surgery            FH:  FAMILY HISTORY:  FH: CHF (congestive heart failure) (Mother)          MEDS:  MEDICATIONS  (STANDING):  atorvastatin 20 milliGRAM(s) Oral at bedtime  donepezil 5 milliGRAM(s) Oral at bedtime  lactated ringers. 1000 milliLiter(s) (75 mL/Hr) IV Continuous <Continuous>  pantoprazole  Injectable 40 milliGRAM(s) IV Push every 12 hours    MEDICATIONS  (PRN):  acetaminophen     Tablet .. 650 milliGRAM(s) Oral every 6 hours PRN Temp greater or equal to 38C (100.4F), Mild Pain (1 - 3)  aluminum hydroxide/magnesium hydroxide/simethicone Suspension 30 milliLiter(s) Oral every 4 hours PRN Dyspepsia  melatonin 3 milliGRAM(s) Oral at bedtime PRN Insomnia  ondansetron Injectable 4 milliGRAM(s) IV Push every 8 hours PRN Nausea and/or Vomiting    Allergies    penicillin (Rash)    Intolerances          ROS: A detailed set of ROS were asked and negative except those outlined in GI HPI.  ______________________________________________________________________  PHYSICAL EXAM:  T(C): 36.7 (04-04-24 @ 04:50), Max: 36.9 (04-03-24 @ 12:51)  HR: 66 (04-04-24 @ 04:50)  BP: 142/65 (04-04-24 @ 04:50)  RR: 16 (04-04-24 @ 04:50)  SpO2: 95% (04-04-24 @ 04:50)  Wt(kg): --      GEN: NAD  HEENT: EOMI, conjunctivae anicteric, neck supple, moist mucous membranes  PULM: LSCTAB, no wheezing, rales, or rhonchi  CV: RRR, no m/r/b  GI: Soft, NT, ND; +BS in all four quadrants, no ascites, no Mccarthy's sign  MSK: PHILLIPS, no edema  NEURO: A&O x 3, no gross deficits  ______________________________________________________________________  LABS:                        7.7    5.52  )-----------( 325      ( 04 Apr 2024 04:52 )             25.8     04-04    136  |  108  |  13  ----------------------------<  102<H>  4.2   |  28  |  0.59    Ca    8.3<L>      04 Apr 2024 04:52  Phos  2.3     04-04  Mg     1.9     04-04    TPro  6.0  /  Alb  2.6<L>  /  TBili  0.1<L>  /  DBili  x   /  AST  19  /  ALT  21  /  AlkPhos  71  04-03    LIVER FUNCTIONS - ( 03 Apr 2024 13:40 )  Alb: 2.6 g/dL / Pro: 6.0 g/dL / ALK PHOS: 71 U/L / ALT: 21 U/L DA / AST: 19 U/L / GGT: x             ____________________________________________    IMAGING:    < from: Xray Chest 1 View- PORTABLE-Urgent (Xray Chest 1 View- PORTABLE-Urgent .) (04.03.24 @ 14:46) >  IMPRESSION: COPD and chronic lung findings againnoted. Old fractures   again seen.    < end of copied text >   INITIAL GI CONSULTATION    Patient is a 84y old  Female who presents with a chief complaint of anemia (03 Apr 2024 15:06)    GI HPI:  Patient is an 84F with a PMHx of HTN, HLD, CAD (s/p drug-eluting stent pRCA, Dr. Ervin at [facility], 10/2021, on asa and plavix), who presented to the ED with low Hgb on OP labs. GI was consulted for anemia.    Patient reports chronic constipation, weakness. Endorses having a good appetite. Denies cp, sob, abd pain, n/v/d, dysphagia, odynophagia, decreased appetite,  unintentional weight loss. No prior EGD. Last colonoscopy ~ 4 yrs ago, unable to recall results, ? polyps, as she was instructed to repeat colonoscopy 5 yrs later. Planned for OP VCE (or virtual colonoscopy?) within the past year however patient declined due to having to travel to [place] for the procedure. No smoking/etoh/drug use. No family hx of liver disease or colorectal cancer. She reports chronic dark brown stools denies hematochezia or melena. Not on PO iron supplements, has never required blood transfusions. Denies excessive NSAID use. Only change in medication is starting PO donepezil in the last 6 months.    In the ED, TREVON neg for melena, labs notable for iron 12L, UIBC 318, TIBC 330, %sat 4L, , BUN 16, Cr 0.69, LFTs wnl, Hgb 6.0, MCV 84.8, plt 327. She received 1u PRBC with appropriate response -> Hgb 7.4 -> 7.7. Per daughter, last Hgb 12/2023 was ~ 11. Admitted to medicine for further management of anemia. Clarified with OP cardiologist that plavix was intended for 6 months post stent, does not have to be on plavix upon discharge - nuclear stress test 12/2023, normal study, no ischemic changes, EF 75%.   CXR: COPD and chronic lung findings again noted, old fractures seen.     PMH/PSH:  PAST MEDICAL & SURGICAL HISTORY:  Essential hypertension      HLD (hyperlipidemia)      Squamous cell cancer of lip      CAD (coronary artery disease)      H/O: hysterectomy      H/O knee surgery            FH:  FAMILY HISTORY:  FH: CHF (congestive heart failure) (Mother)          MEDS:  MEDICATIONS  (STANDING):  atorvastatin 20 milliGRAM(s) Oral at bedtime  donepezil 5 milliGRAM(s) Oral at bedtime  lactated ringers. 1000 milliLiter(s) (75 mL/Hr) IV Continuous <Continuous>  pantoprazole  Injectable 40 milliGRAM(s) IV Push every 12 hours    MEDICATIONS  (PRN):  acetaminophen     Tablet .. 650 milliGRAM(s) Oral every 6 hours PRN Temp greater or equal to 38C (100.4F), Mild Pain (1 - 3)  aluminum hydroxide/magnesium hydroxide/simethicone Suspension 30 milliLiter(s) Oral every 4 hours PRN Dyspepsia  melatonin 3 milliGRAM(s) Oral at bedtime PRN Insomnia  ondansetron Injectable 4 milliGRAM(s) IV Push every 8 hours PRN Nausea and/or Vomiting    Allergies    penicillin (Rash)    Intolerances          ROS: A detailed set of ROS were asked and negative except those outlined in GI HPI.  ______________________________________________________________________  PHYSICAL EXAM:  T(C): 36.7 (04-04-24 @ 04:50), Max: 36.9 (04-03-24 @ 12:51)  HR: 66 (04-04-24 @ 04:50)  BP: 142/65 (04-04-24 @ 04:50)  RR: 16 (04-04-24 @ 04:50)  SpO2: 95% (04-04-24 @ 04:50)  Wt(kg): --      GEN: NAD  HEENT: EOMI, conjunctivae anicteric, neck supple, moist mucous membranes  PULM: LSCTAB, no wheezing, rales, or rhonchi  CV: RRR, no m/r/b  GI: Soft, NT, ND; +BS in all four quadrants, no ascites, no Mccarthy's sign  MSK: PHILLIPS, no edema  NEURO: A&O x 3, no gross deficits  ______________________________________________________________________  LABS:                        7.7  admitted with Hb 6, s/p 1 U PRBC, prior value 12 2021  5.52  )-----------( 325      ( 04 Apr 2024 04:52 )             25.8     04-04    136  |  108  |  13  ----------------------------<  102<H>  4.2   |  28  |  0.59    Ca    8.3<L>      04 Apr 2024 04:52  Phos  2.3     04-04  Mg     1.9     04-04    TPro  6.0  /  Alb  2.6<L>  /  TBili  0.1<L>  /  DBili  x   /  AST  19  /  ALT  21  /  AlkPhos  71  04-03    LIVER FUNCTIONS - ( 03 Apr 2024 13:40 )  Alb: 2.6 g/dL / Pro: 6.0 g/dL / ALK PHOS: 71 U/L / ALT: 21 U/L DA / AST: 19 U/L / GGT: x             ____________________________________________    IMAGING:    < from: Xray Chest 1 View- PORTABLE-Urgent (Xray Chest 1 View- PORTABLE-Urgent .) (04.03.24 @ 14:46) >  IMPRESSION: COPD and chronic lung findings againnoted. Old fractures   again seen.    < end of copied text >

## 2024-04-04 NOTE — PROGRESS NOTE ADULT - SUBJECTIVE AND OBJECTIVE BOX
PGY-1 Progress Note discussed with attending    PAGER #: [] TILL 5:00 PM  PLEASE CONTACT ON CALL TEAM:  - On Call Team (Please refer to Adalberto) FROM 5:00 PM - 8:30PM  - Nightfloat Team FROM 8:30 -7:30 AM    CHIEF COMPLAINT & BRIEF HOSPITAL COURSE:    INTERVAL HPI/OVERNIGHT EVENTS:       REVIEW OF SYSTEMS:  CONSTITUTIONAL: No fever, weight loss, or fatigue  RESPIRATORY: No cough, wheezing, chills or hemoptysis; No shortness of breath  CARDIOVASCULAR: No chest pain, palpitations, dizziness, or leg swelling  GASTROINTESTINAL: No abdominal pain. No nausea, vomiting, or hematemesis; No diarrhea or constipation. No melena or hematochezia.  GENITOURINARY: No dysuria or hematuria, urinary frequency  NEUROLOGICAL: No headaches, memory loss, loss of strength, numbness, or tremors  SKIN: No itching, burning, rashes, or lesions     Vital Signs Last 24 Hrs  T(C): 36.3 (04 Apr 2024 13:09), Max: 36.7 (04 Apr 2024 04:50)  T(F): 97.3 (04 Apr 2024 13:09), Max: 98 (04 Apr 2024 04:50)  HR: 69 (04 Apr 2024 13:09) (66 - 69)  BP: 127/77 (04 Apr 2024 13:09) (127/77 - 144/62)  BP(mean): --  RR: 17 (04 Apr 2024 13:09) (16 - 18)  SpO2: 97% (04 Apr 2024 13:09) (95% - 97%)    Parameters below as of 04 Apr 2024 13:09  Patient On (Oxygen Delivery Method): room air        PHYSICAL EXAMINATION:  GENERAL: NAD  HEAD:  Atraumatic, Normocephalic  EYES:  conjunctiva and sclera clear  NECK: Supple, No JVD, Normal thyroid  CHEST/LUNG: Clear to auscultation. Clear to percussion bilaterally; No rales, rhonchi, wheezing, or rubs  HEART: Regular rate and rhythm; No murmurs, rubs, or gallops  ABDOMEN: Soft, Nontender, Nondistended; Bowel sounds present  NERVOUS SYSTEM:  Alert & Oriented X3,    EXTREMITIES:  2+ Peripheral Pulses, No clubbing, cyanosis, or edema  SKIN: warm dry                          7.7    5.52  )-----------( 325      ( 04 Apr 2024 04:52 )             25.8     04-04    136  |  108  |  13  ----------------------------<  102<H>  4.2   |  28  |  0.59    Ca    8.3<L>      04 Apr 2024 04:52  Phos  2.3     04-04  Mg     1.9     04-04    TPro  6.0  /  Alb  2.6<L>  /  TBili  0.1<L>  /  DBili  x   /  AST  19  /  ALT  21  /  AlkPhos  71  04-03    LIVER FUNCTIONS - ( 03 Apr 2024 13:40 )  Alb: 2.6 g/dL / Pro: 6.0 g/dL / ALK PHOS: 71 U/L / ALT: 21 U/L DA / AST: 19 U/L / GGT: x                   CAPILLARY BLOOD GLUCOSE      RADIOLOGY & ADDITIONAL TESTS:                   PGY-1 Progress Note discussed with attending    PAGER #: [] TILL 5:00 PM  PLEASE CONTACT ON CALL TEAM:  - On Call Team (Please refer to Adalberto) FROM 5:00 PM - 8:30PM  - Nightfloat Team FROM 8:30 -7:30 AM    CHIEF COMPLAINT & BRIEF HOSPITAL COURSE:    INTERVAL HPI/OVERNIGHT EVENTS:   No acute overnight events. On bedside exam patient stated they were feeling weak but otherwise okay.     REVIEW OF SYSTEMS:  CONSTITUTIONAL: No fever, weight loss, or fatigue  RESPIRATORY: No cough, wheezing, chills or hemoptysis; No shortness of breath  CARDIOVASCULAR: No chest pain, palpitations, dizziness, or leg swelling  GASTROINTESTINAL: abdominal pain  localized at the lower quadrants. No nausea, vomiting, or hematemesis; No diarrhea or constipation. No melena or hematochezia.  GENITOURINARY: No dysuria or hematuria, urinary frequency  NEUROLOGICAL: No headaches, memory loss, loss of strength, numbness, or tremors  SKIN: No itching, burning, rashes, or lesions     Vital Signs Last 24 Hrs  T(C): 36.3 (04 Apr 2024 13:09), Max: 36.7 (04 Apr 2024 04:50)  T(F): 97.3 (04 Apr 2024 13:09), Max: 98 (04 Apr 2024 04:50)  HR: 69 (04 Apr 2024 13:09) (66 - 69)  BP: 127/77 (04 Apr 2024 13:09) (127/77 - 144/62)  BP(mean): --  RR: 17 (04 Apr 2024 13:09) (16 - 18)  SpO2: 97% (04 Apr 2024 13:09) (95% - 97%)    Parameters below as of 04 Apr 2024 13:09  Patient On (Oxygen Delivery Method): room air        PHYSICAL EXAMINATION:  GENERAL: NAD  HEAD:  Atraumatic, Normocephalic  EYES:  conjunctiva and sclera clear  NECK: Supple, No JVD, Normal thyroid  CHEST/LUNG: Clear to auscultation. No rales, rhonchi, wheezing, or rubs  HEART: Regular rate and rhythm; No murmurs, rubs, or gallops  ABDOMEN: Soft, tender in lower quadrants. ; Bowel sounds present  NERVOUS SYSTEM:  Alert & Oriented X3,    EXTREMITIES:  2+ Peripheral Pulses, No clubbing, cyanosis, or edema  SKIN: warm dry                          7.7    5.52  )-----------( 325      ( 04 Apr 2024 04:52 )             25.8     04-04    136  |  108  |  13  ----------------------------<  102<H>  4.2   |  28  |  0.59    Ca    8.3<L>      04 Apr 2024 04:52  Phos  2.3     04-04  Mg     1.9     04-04    TPro  6.0  /  Alb  2.6<L>  /  TBili  0.1<L>  /  DBili  x   /  AST  19  /  ALT  21  /  AlkPhos  71  04-03    LIVER FUNCTIONS - ( 03 Apr 2024 13:40 )  Alb: 2.6 g/dL / Pro: 6.0 g/dL / ALK PHOS: 71 U/L / ALT: 21 U/L DA / AST: 19 U/L / GGT: x                   CAPILLARY BLOOD GLUCOSE      RADIOLOGY & ADDITIONAL TESTS:

## 2024-04-04 NOTE — PROGRESS NOTE ADULT - ATTENDING COMMENTS
Patient is a 84y F, ambulates independently, PMHx of HTN, HLD, CAD s/p stent- Oct'2021. Patient came due a low hb of 6 on recent lab work.    Pt was evaluated, reports feeling well. She denies black stools but said that she is always constipated and has chronic dark stools Patient is a 84y F, ambulates independently, PMHx of HTN, HLD, CAD s/p stent- Oct'2021. Patient came due a low hb of 6 on recent lab work.    Pt was evaluated, reports feeling well. She denies black stools but said that she is always constipated and has chronic dark stools    PE as above     Labs reviewed- cbc, bmp, Ferritin, TIBC     Labs reviewed-cbc, bmp    A/P:  #Iron deficiency anemia   #Colon mass- suspect  malignancy  #HTN  #HLD  #CAD s/p stents     Plan:  -Patient p/w low H/H, s/p 1 unit PRBC. 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  -Monitor cbc overnight, DC plan home in am if H/H stable. Advance diet

## 2024-04-04 NOTE — PROGRESS NOTE ADULT - PROBLEM SELECTOR PLAN 4
hx of CAD s/p proximal RCA stent in 2021  home med: [id number], isosorbide mono ER 35qd, metoprolol succinate 25 qd, plavix 75  holding home meds as pt is normotensive and Low volume Hb 6 and holding DAPT due to bleeding/low Hb  cards: Dr. Ervin at [facility] in Oct 2021  prior LVEF in 2021: 72%  Dr. Chapman talked to cardiologist  - as per note pt was suppose to be on plavix for only 6 month post stent  - confirmed by cardiologist (4/3): patient can be discharged without DAPT hx of HLD  home med: atorvastatin 20mg  c/w home med

## 2024-04-04 NOTE — PROGRESS NOTE ADULT - PROBLEM SELECTOR PLAN 2
Hx of HTN   home meds: lasix 20 qd, metoprolol succinate 25 qd  hold as pt is normotensive  monitor blood pressure Pt presented due to recently Lab report of Hb showing 6  chr. weakness, chr. constipated, hx of questionable chr. dark stool for months. no active bleeding  home meds RF: aspirin and plavix  H/H on adm: 6/20  iron: 12. TIBC wnl. iron sat 4  LDH: wnl  prior colonscopy more than 5 years ago.never had an egd  suspecting source of bleed in likely GI, possibly very minimally bleeding over a long duration  f/u CTA A/P to rule out bleed  f/u haptoglobin (to r/o hemolytic anemia)    NPO as pt is pending GI eval (colonoscopy procedure)  IV PPI  s/p 1 u prbc   f/u post transfusion cbc at 8pm on 4/3.  monitor H/H q12h   Dr. Levin ([facility]) consulted Pt presented due to recently Lab report of Hb showing 6  chr. weakness, chr. constipated, hx of questionable chr. dark stool for months. no active bleeding  home meds RF: aspirin and plavix  H/H on adm: 6/20  C/w KG  no signs of hemolysis  prior colonscopy more than 5 years ago.never had an egd  suspecting source of bleed in likely GI, possibly very minimally bleeding over a long duration  CTA A/P negative for bleed  IV PPI  s/p 1 u prbc   post transfusion cbc was 7.4  monitor H/H q12h   Dr. Levin ([facility]) consulted

## 2024-04-04 NOTE — CONSULT NOTE ADULT - NS ATTEND AMEND GEN_ALL_CORE FT
84F with a PMHx of HTN, HLD, CAD (s/p drug-eluting stent pRCA, Dr. Ervin at [facility], 10/2021, on asa and plavix), who presented to the ED with low Hgb on OP labs. GI was consulted for anemia, admitted with Hb 6  Pt doesn't want inpatient eval for this anemia  Should see me in clinic as outpatient to discuss eval possibilities - VCE is a local option, for example.

## 2024-04-04 NOTE — CONSULT NOTE ADULT - ASSESSMENT
Patient is an 84F with a PMHx of HTN, HLD, CAD (s/p drug-eluting stent pRCA, Dr. Ervin at [facility], 10/2021, on asa and plavix), who presented to the ED with low Hgb on OP labs. GI was consulted for anemia.    #Anemia  #Constipation  Patient presented with weakness and OP labs notable for Hgb 6. In the ED, Hgb 6, s/p 1u PRBC -> Hgb 7.4 -> 7.7. No overt signs of bleeding, she endorses dark brown stools but denies melena or hematochezia. Extensive discussion with patient she does not want to pursue endoscopic evaluation, emphasized that we are unable to identify clear source of blood loss that is causing anemia, she verbalized that she is tired, old, does not want to undergo anesthesia, emphasized that her heart is weak. She is agreeable for CTA otherwise declining endoscopic evaluation.     	- CTA abd/pelvis pending  	- Patient declined endoscopic evaluation  	- Supportive care  	- Maintain active T&S, 2 large bore peripheral IVs, transfuse for goal Hgb >7 or if symptomatic  	- Trend H/H  	- Monitor for s/sx of GI bleed  	- IV PPI daily, PO OK    This note and its recommendations herein are preliminary until such time as cosigned by an attending.    Thank you for this consult! Patient is an 84F with a PMHx of HTN, HLD, CAD (s/p drug-eluting stent pRCA, Dr. Ervin at [facility], 10/2021, on asa and plavix), who presented to the ED with low Hgb on OP labs. GI was consulted for anemia.    #Anemia  #Constipation  Patient presented with weakness and OP labs notable for Hgb 6. In the ED, Hgb 6, s/p 1u PRBC -> Hgb 7.4 -> 7.7. No overt signs of bleeding, she endorses dark brown stools but denies melena or hematochezia. Extensive discussion with patient she does not want to pursue endoscopic evaluation, emphasized that we are unable to identify clear source of blood loss that is causing anemia, she verbalized that she is "tired, old, does not want to undergo anesthesia", emphasized that her "heart is weak". She is agreeable for CTA otherwise declining endoscopic evaluation. Differentials varied given no prior imaging or EGD, ? esophagitis vs gastritis vs peptic/duodenal ulcer (though denies excessive use of NSAIDs) vs AVMs vs dieulafoy lesion vs malignancy vs other.     	- CTA abd/pelvis pending  	- Patient declined endoscopic evaluation  	- Supportive care  	- Maintain active T&S, 2 large bore peripheral IVs, transfuse for goal Hgb >7 or if symptomatic  	- Trend H/H  	- Monitor for s/sx of GI bleed  	- IV PPI daily, PO OK    This note and its recommendations herein are preliminary until such time as cosigned by an attending.    Thank you for this consult! Patient is an 84F with a PMHx of HTN, HLD, CAD (s/p drug-eluting stent pRCA, Dr. Ervin at [facility], 10/2021, on asa and plavix), who presented to the ED with low Hgb on OP labs. GI was consulted for anemia.    #Anemia  #Constipation  Patient presented with weakness and OP labs notable for Hgb 6. In the ED, Hgb 6, s/p 1u PRBC -> Hgb 7.4 -> 7.7. No overt signs of bleeding, she endorses dark brown stools but denies melena or hematochezia. Extensive discussion with patient she does not want to pursue endoscopic evaluation, emphasized that we are unable to identify clear source of blood loss that is causing anemia, she verbalized that she is "tired, old, does not want to undergo anesthesia", emphasized that her "heart is weak". She is agreeable for CTA otherwise declining endoscopic evaluation. Differentials varied given no prior imaging or EGD, ? esophagitis vs gastritis vs peptic/duodenal ulcer (though denies excessive use of NSAIDs) vs AVMs vs dieulafoy lesion vs malignancy vs other.     	- CTA abd/pelvis pending  	- Patient declined endoscopic evaluation  	- Supportive care  	- Maintain active T&S, 2 large bore peripheral IVs, transfuse for goal Hgb >7 or if symptomatic  	- Trend H/H  	- Monitor for s/sx of GI bleed  	- IV PPI daily, PO OK  	- Outpatient GI follow up     This note and its recommendations herein are preliminary until such time as cosigned by an attending.    Thank you for this consult!

## 2024-04-04 NOTE — CONSULT NOTE ADULT - SUBJECTIVE AND OBJECTIVE BOX
Patient is an 84F with a PMHx of HTN, HLD, CAD (s/p drug-eluting stent pRCA, Dr. Ervin at [facility], 10/2021, on asa and plavix), who presented to the ED with low Hgb on OP labs. GI was consulted for anemia.    Patient reports chronic constipation, weakness. Endorses having a good appetite. Denies cp, sob, abd pain, n/v/d, dysphagia, odynophagia, decreased appetite,  unintentional weight loss. No prior EGD. Last colonoscopy ~ 4 yrs ago, unable to recall results, ? polyps, as she was instructed to repeat colonoscopy 5 yrs later. Planned for OP VCE (or virtual colonoscopy?) within the past year however patient declined due to having to travel to [place] for the procedure. No smoking/etoh/drug use. No family hx of liver disease or colorectal cancer. She reports chronic dark brown stools denies hematochezia or melena. Not on PO iron supplements, has never required blood transfusions. Denies excessive NSAID use. Only change in medication is starting PO donepezil in the last 6 months.    In the ED, TREVON neg for melena, labs notable for iron 12L, UIBC 318, TIBC 330, %sat 4L, , BUN 16, Cr 0.69, LFTs wnl, Hgb 6.0, MCV 84.8, plt 327. She received 1u PRBC with appropriate response -> Hgb 7.4 -> 7.7. Per daughter, last Hgb 12/2023 was ~ 11. Admitted to medicine for further management of anemia. Clarified with OP cardiologist that plavix was intended for 6 months post stent, does not have to be on plavix upon discharge - nuclear stress test 12/2023, normal study, no ischemic changes, EF 75%.   PE  Alert oriented, on no distress  No LN palpable  LungC Lear  Cor RR  Abdomen: benign    Complete Blood Count (04.04.24 @ 04:52)   Nucleated RBC: 0 /100 WBCs  WBC Count: 5.52 K/uL  RBC Count: 3.13 M/uL  Hemoglobin: 7.7 g/dL  Hematocrit: 25.8 %  Mean Cell Volume: 82.4 fl  Mean Cell Hemoglobin: 24.6 pg  Mean Cell Hemoglobin Conc: 29.8 gm/dL  Red Cell Distrib Width: 15.9 %  Platelet Count - Automated: 325 K/uL  Basic Metabolic Panel (04.04.24 @ 04:52)   Sodium: 136 mmol/L  Potassium: 4.2 mmol/L  Chloride: 108 mmol/L  Carbon Dioxide: 28 mmol/L  Anion Gap: 0 mmol/L  Blood Urea Nitrogen: 13 mg/dL  Creatinine: 0.59 mg/dL  Glucose: 102 mg/dL  Calcium: 8.3 mg/dL  eGFR: 88: The estimated glomerular filtration rate (eGFR) is calculated using the       ACC: [id number] EXAM:  CT ABDOMEN AND PELVIS IC   ORDERED BY: ARIANA VILLALTA     *** ADDENDUM # 1 ***    Add to  impression:    Regional adenopathy adjacent to the ascending colonic tumor    --- End of Report ---    *** END OF ADDENDUM # 1 ***      PROCEDURE DATE:  04/04/2024          INTERPRETATION:  CLINICAL INFORMATION: 84 years  Female with GI Bleed.    COMPARISON: None.    CONTRAST/COMPLICATIONS:  IV Contrast: Omnipaque 350  90 cc administered   10 cc discarded  Oral Contrast: NONE  Complications: None reported at time of study completion    PROCEDURE:  CT of the Abdomen and Pelvis was performed.  Precontrast, Arterial and Delayed phases were performed.  Sagittal and coronal reformats were performed.    FINDINGS:  LOWER CHEST: Trace right pleural effusion. No left pleural effusion    LIVER: Within normal limits.  BILE DUCTS: Normal caliber.  GALLBLADDER: Within normal limits.  SPLEEN: Within normal limits.  PANCREAS: Within normal limits.  ADRENALS: Within normal limits.  KIDNEYS/URETERS: Within normal limits.    BLADDER: Within normal limits.  REPRODUCTIVE ORGANS: Hysterectomy.    BOWEL: No bowel obstruction. The distal appendix is dilated with   irregular wall thickening and surrounding fluid (10:72) concerning for   tip appendicitis.  6.7 cm in length mid ascending colonic concentric irregular wall   thickening (18:30), likely malignancy. No intraluminal contrast   extravasation.  PERITONEUM: No ascites.  VESSELS: Within normal limits.  RETROPERITONEUM/LYMPH NODES: 1.7 x 1.0 cm paracolic lymph node (10:73).   1.4 x 1.1 cm mesenteric node (18:38).  ABDOMINAL WALL: Within normal limits.  BONES: Within normal limits.    IMPRESSION:    Suspect tip appendicitis.    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    No CT evidence of active GI bleed.    Findings were discussed with Dr. ARIANA VILLALTA [id number] 4/4/2024 12:07 PM   by Dr. Kasia Grijalva with read back confirmation.    --- End of Report ---    ***Please see the addendum at the top of this report. It may contain   additional important information or changes.****

## 2024-04-05 ENCOUNTER — TRANSCRIPTION ENCOUNTER (OUTPATIENT)
Age: 84
End: 2024-04-05

## 2024-04-05 VITALS
DIASTOLIC BLOOD PRESSURE: 66 MMHG | RESPIRATION RATE: 17 BRPM | SYSTOLIC BLOOD PRESSURE: 118 MMHG | TEMPERATURE: 98 F | HEART RATE: 78 BPM | OXYGEN SATURATION: 97 %

## 2024-04-05 LAB
ALBUMIN SERPL ELPH-MCNC: 2.7 G/DL — LOW (ref 3.5–5)
ALP SERPL-CCNC: 87 U/L — SIGNIFICANT CHANGE UP (ref 40–120)
ALT FLD-CCNC: 22 U/L DA — SIGNIFICANT CHANGE UP (ref 10–60)
ANION GAP SERPL CALC-SCNC: 5 MMOL/L — SIGNIFICANT CHANGE UP (ref 5–17)
AST SERPL-CCNC: 22 U/L — SIGNIFICANT CHANGE UP (ref 10–40)
BASOPHILS # BLD AUTO: 0.04 K/UL — SIGNIFICANT CHANGE UP (ref 0–0.2)
BASOPHILS NFR BLD AUTO: 0.7 % — SIGNIFICANT CHANGE UP (ref 0–2)
BILIRUB SERPL-MCNC: 0.3 MG/DL — SIGNIFICANT CHANGE UP (ref 0.2–1.2)
BUN SERPL-MCNC: 12 MG/DL — SIGNIFICANT CHANGE UP (ref 7–18)
CALCIUM SERPL-MCNC: 8.7 MG/DL — SIGNIFICANT CHANGE UP (ref 8.4–10.5)
CHLORIDE SERPL-SCNC: 108 MMOL/L — SIGNIFICANT CHANGE UP (ref 96–108)
CO2 SERPL-SCNC: 24 MMOL/L — SIGNIFICANT CHANGE UP (ref 22–31)
CREAT SERPL-MCNC: 0.74 MG/DL — SIGNIFICANT CHANGE UP (ref 0.5–1.3)
EGFR: 79 ML/MIN/1.73M2 — SIGNIFICANT CHANGE UP
EOSINOPHIL # BLD AUTO: 0.04 K/UL — SIGNIFICANT CHANGE UP (ref 0–0.5)
EOSINOPHIL NFR BLD AUTO: 0.7 % — SIGNIFICANT CHANGE UP (ref 0–6)
GLUCOSE SERPL-MCNC: 115 MG/DL — HIGH (ref 70–99)
HCT VFR BLD CALC: 28.2 % — LOW (ref 34.5–45)
HGB BLD-MCNC: 8.5 G/DL — LOW (ref 11.5–15.5)
IMM GRANULOCYTES NFR BLD AUTO: 0.4 % — SIGNIFICANT CHANGE UP (ref 0–0.9)
LYMPHOCYTES # BLD AUTO: 1.11 K/UL — SIGNIFICANT CHANGE UP (ref 1–3.3)
LYMPHOCYTES # BLD AUTO: 19.8 % — SIGNIFICANT CHANGE UP (ref 13–44)
MAGNESIUM SERPL-MCNC: 2.2 MG/DL — SIGNIFICANT CHANGE UP (ref 1.6–2.6)
MCHC RBC-ENTMCNC: 24.8 PG — LOW (ref 27–34)
MCHC RBC-ENTMCNC: 30.1 GM/DL — LOW (ref 32–36)
MCV RBC AUTO: 82.2 FL — SIGNIFICANT CHANGE UP (ref 80–100)
MONOCYTES # BLD AUTO: 0.64 K/UL — SIGNIFICANT CHANGE UP (ref 0–0.9)
MONOCYTES NFR BLD AUTO: 11.4 % — SIGNIFICANT CHANGE UP (ref 2–14)
NEUTROPHILS # BLD AUTO: 3.75 K/UL — SIGNIFICANT CHANGE UP (ref 1.8–7.4)
NEUTROPHILS NFR BLD AUTO: 67 % — SIGNIFICANT CHANGE UP (ref 43–77)
NRBC # BLD: 0 /100 WBCS — SIGNIFICANT CHANGE UP (ref 0–0)
PHOSPHATE SERPL-MCNC: 2.4 MG/DL — LOW (ref 2.5–4.5)
PLATELET # BLD AUTO: 338 K/UL — SIGNIFICANT CHANGE UP (ref 150–400)
POTASSIUM SERPL-MCNC: 3.7 MMOL/L — SIGNIFICANT CHANGE UP (ref 3.5–5.3)
POTASSIUM SERPL-SCNC: 3.7 MMOL/L — SIGNIFICANT CHANGE UP (ref 3.5–5.3)
PROT SERPL-MCNC: 6.3 G/DL — SIGNIFICANT CHANGE UP (ref 6–8.3)
RBC # BLD: 3.43 M/UL — LOW (ref 3.8–5.2)
RBC # FLD: 15.9 % — HIGH (ref 10.3–14.5)
SODIUM SERPL-SCNC: 137 MMOL/L — SIGNIFICANT CHANGE UP (ref 135–145)
WBC # BLD: 5.6 K/UL — SIGNIFICANT CHANGE UP (ref 3.8–10.5)
WBC # FLD AUTO: 5.6 K/UL — SIGNIFICANT CHANGE UP (ref 3.8–10.5)

## 2024-04-05 PROCEDURE — 83550 IRON BINDING TEST: CPT

## 2024-04-05 PROCEDURE — 99239 HOSP IP/OBS DSCHRG MGMT >30: CPT | Mod: GC

## 2024-04-05 PROCEDURE — 83540 ASSAY OF IRON: CPT

## 2024-04-05 PROCEDURE — 85025 COMPLETE CBC W/AUTO DIFF WBC: CPT

## 2024-04-05 PROCEDURE — 82746 ASSAY OF FOLIC ACID SERUM: CPT

## 2024-04-05 PROCEDURE — 86923 COMPATIBILITY TEST ELECTRIC: CPT

## 2024-04-05 PROCEDURE — 86850 RBC ANTIBODY SCREEN: CPT

## 2024-04-05 PROCEDURE — 85045 AUTOMATED RETICULOCYTE COUNT: CPT

## 2024-04-05 PROCEDURE — 84100 ASSAY OF PHOSPHORUS: CPT

## 2024-04-05 PROCEDURE — 74177 CT ABD & PELVIS W/CONTRAST: CPT | Mod: MC

## 2024-04-05 PROCEDURE — 83615 LACTATE (LD) (LDH) ENZYME: CPT

## 2024-04-05 PROCEDURE — 36430 TRANSFUSION BLD/BLD COMPNT: CPT

## 2024-04-05 PROCEDURE — P9040: CPT

## 2024-04-05 PROCEDURE — 36415 COLL VENOUS BLD VENIPUNCTURE: CPT

## 2024-04-05 PROCEDURE — 82607 VITAMIN B-12: CPT

## 2024-04-05 PROCEDURE — 80053 COMPREHEN METABOLIC PANEL: CPT

## 2024-04-05 PROCEDURE — 86900 BLOOD TYPING SEROLOGIC ABO: CPT

## 2024-04-05 PROCEDURE — 82728 ASSAY OF FERRITIN: CPT

## 2024-04-05 PROCEDURE — 83735 ASSAY OF MAGNESIUM: CPT

## 2024-04-05 PROCEDURE — 99285 EMERGENCY DEPT VISIT HI MDM: CPT

## 2024-04-05 PROCEDURE — 71045 X-RAY EXAM CHEST 1 VIEW: CPT

## 2024-04-05 PROCEDURE — 85027 COMPLETE CBC AUTOMATED: CPT

## 2024-04-05 PROCEDURE — 80048 BASIC METABOLIC PNL TOTAL CA: CPT

## 2024-04-05 PROCEDURE — 83010 ASSAY OF HAPTOGLOBIN QUANT: CPT

## 2024-04-05 PROCEDURE — 86901 BLOOD TYPING SEROLOGIC RH(D): CPT

## 2024-04-05 PROCEDURE — 82962 GLUCOSE BLOOD TEST: CPT

## 2024-04-05 RX ORDER — FERROUS FUMARATE 350(115)MG
1 TABLET ORAL
Qty: 30 | Refills: 0
Start: 2024-04-05 | End: 2024-05-04

## 2024-04-05 RX ORDER — SODIUM,POTASSIUM PHOSPHATES 278-250MG
1 POWDER IN PACKET (EA) ORAL ONCE
Refills: 0 | Status: COMPLETED | OUTPATIENT
Start: 2024-04-05 | End: 2024-04-05

## 2024-04-05 RX ORDER — POLYETHYLENE GLYCOL 3350 17 G/17G
17 POWDER, FOR SOLUTION ORAL
Qty: 1 | Refills: 0
Start: 2024-04-05 | End: 2024-05-04

## 2024-04-05 RX ADMIN — PANTOPRAZOLE SODIUM 40 MILLIGRAM(S): 20 TABLET, DELAYED RELEASE ORAL at 05:25

## 2024-04-05 RX ADMIN — Medication 1 PACKET(S): at 11:01

## 2024-04-05 NOTE — DISCHARGE NOTE PROVIDER - CARE PROVIDERS DIRECT ADDRESSES
[email or web address]

## 2024-04-05 NOTE — DISCHARGE NOTE PROVIDER - CARE PROVIDER_API CALL
Nelida Oquendo  Gastroenterology  [address]  [place] [address]  Phone: [phone]  Fax: [phone]  Follow Up Time: 1 week   Nelida Oquendo  Gastroenterology  [address]  [place] [address]  Phone: [phone]  Fax: [phone]  Follow Up Time: 1 week    Soila Sauceda  Internal Medicine  [address] [place] [address]  Phone: [phone]  Fax: [phone]  Established Patient  Follow Up Time: 1 week    Jose Alejandro Ervin  Cardiovascular Disease  [address] [place] [address]  Phone: [phone]  Fax: [phone]  Established Patient  Follow Up Time: 1 week

## 2024-04-05 NOTE — DISCHARGE NOTE PROVIDER - NSDCMRMEDTOKEN_GEN_ALL_CORE_FT
Aspirin Enteric Coated 81 mg oral delayed release tablet: 1 tab(s) orally once a day  atorvastatin 20 mg oral tablet: 1 tab(s) orally once a day  Calcium 600+D 600 mg-5 mcg (200 intl units) oral tablet: 2 tab(s) orally once a day  Cranberry oral tablet: 2 tab(s) orally once a day  donepezil 5 mg oral tablet: 1 tab(s) orally once a day  ferrous fumarate 325 mg (106 mg elemental iron) oral tablet: 1 tab(s) orally once a day  Fish Oil 500 mg oral capsule: 1 cap(s) orally once a day  furosemide 20 mg oral tablet: 1 tab(s) orally once a day  isosorbide mononitrate 30 mg oral tablet, extended release: 1 tab(s) orally once a day (in the morning)  Metoprolol Tartrate 25 mg oral tablet: 0.5 tab(s) orally 2 times a day   Plavix 75 mg oral tablet: 1 tab(s) orally once a day with your first dose to be taken tomorrow 10/30 in the morning    Aspirin Enteric Coated 81 mg oral delayed release tablet: 1 tab(s) orally once a day  atorvastatin 20 mg oral tablet: 1 tab(s) orally once a day  Calcium 600+D 600 mg-5 mcg (200 intl units) oral tablet: 2 tab(s) orally once a day  Cranberry oral tablet: 2 tab(s) orally once a day  donepezil 5 mg oral tablet: 1 tab(s) orally once a day  ferrous fumarate 325 mg (106 mg elemental iron) oral tablet: 1 tab(s) orally once a day  Fish Oil 500 mg oral capsule: 1 cap(s) orally once a day  furosemide 20 mg oral tablet: 1 tab(s) orally once a day  isosorbide mononitrate 30 mg oral tablet, extended release: 1 tab(s) orally once a day (in the morning)  Metoprolol Tartrate 25 mg oral tablet: 0.5 tab(s) orally 2 times a day    Aspirin Enteric Coated 81 mg oral delayed release tablet: 1 tab(s) orally once a day  atorvastatin 20 mg oral tablet: 1 tab(s) orally once a day  Calcium 600+D 600 mg-5 mcg (200 intl units) oral tablet: 2 tab(s) orally once a day  Cranberry oral tablet: 2 tab(s) orally once a day  donepezil 5 mg oral tablet: 1 tab(s) orally once a day  ferrous fumarate 325 mg (106 mg elemental iron) oral tablet: 1 tab(s) orally once a day  Fish Oil 500 mg oral capsule: 1 cap(s) orally once a day  furosemide 20 mg oral tablet: 1 tab(s) orally once a day  isosorbide mononitrate 30 mg oral tablet, extended release: 1 tab(s) orally once a day (in the morning)  Metoprolol Tartrate 25 mg oral tablet: 0.5 tab(s) orally 2 times a day   MiraLax oral powder for reconstitution: 17 gram(s) orally once a day

## 2024-04-05 NOTE — DISCHARGE NOTE PROVIDER - HOSPITAL COURSE
Patient is a 84y F, w/ PMHx of HTN, HLD, CAD s/p stent. Patient came due a low hb of 6 on recent lab work. She notes chronic weakness, mild lower abdominal pain and chronic constipation with questionable dark stool for months. She is currently on aspirin and plavix. Site of bleeding is likely GI. Consulted Dr. Levin ([facility]). Patient admitted for work up of anemia. Patient Had hb of 6 on admission and was transfused 1u PRBC. Iron studies were consistent with KG. Patient was started on Venofer. Post transfusion CBC was 7.4. No active bleeding appreciated. CT angio of AP was negative for bleed but was positive for  a mass in the mid ascending colon wall. Heme onc was consulted and patient was not agreeable to getting treatment. Patients hemoglobin remained stable.    Given patient's improved clinical status and current hemodynamic stability, decision was made to discharge the patient.  Patient is stable for discharge per attending and is advised to follow up with PCP as outpatient  Please refer to patient's complete medical chart with documents for a full hospital course, for this is only a brief summary. Patient is a 84y F, w/ PMHx of HTN, HLD, CAD s/p stent. Patient came due a low hb of 6 on recent lab work. She notes chronic weakness, mild lower abdominal pain and chronic constipation with questionable dark stool for months. She is currently on aspirin and plavix. Site of bleeding is likely GI. Consulted Dr. Levin ([facility]). Patient admitted for work up of anemia. Patient Had hb of 6 on admission and was transfused 1u PRBC. Aspirin and plavix were held. Iron studies were consistent with KG. Patient was started on Venofer. Post transfusion CBC was 7.4. No active bleeding appreciated. CT angio of AP was negative for bleed but was positive for  a mass in the mid ascending colon wall. Heme onc was consulted and patient was not agreeable to getting treatment. Patients hemoglobin remained stable.    Given patient's improved clinical status and current hemodynamic stability, decision was made to discharge the patient.  Patient is stable for discharge per attending and is advised to follow up with PCP as outpatient  Please refer to patient's complete medical chart with documents for a full hospital course, for this is only a brief summary.

## 2024-04-05 NOTE — DISCHARGE NOTE PROVIDER - NSDCCPCAREPLAN_GEN_ALL_CORE_FT
PRINCIPAL DISCHARGE DIAGNOSIS  Diagnosis: Symptomatic anemia  Assessment and Plan of Treatment: You came in with weakness secondary to a low red blood cell count. You have Iron deficiency anemia based on the iron studies taken. This is a result of the mass in the colon that was found on CT scan. You were started on an iron infusion and will be sent home with  FERROUS      SECONDARY DISCHARGE DIAGNOSES  Diagnosis: Colonic mass  Assessment and Plan of Treatment:      PRINCIPAL DISCHARGE DIAGNOSIS  Diagnosis: Symptomatic anemia  Assessment and Plan of Treatment: You came in with weakness secondary to a low red blood cell count. You have Iron deficiency anemia based on the iron studies taken. This is a result of the mass in the colon that was found on CT scan. You were started on an iron infusion and will be sent home with FERROUS FUMURATE TO BE TAKEN ONCE A DAY. You are advised to follow up with your PRIMARY CARE PHYSICIAN within 1 week of being discharged.      SECONDARY DISCHARGE DIAGNOSES  Diagnosis: Colonic mass  Assessment and Plan of Treatment: A mass was visualized in your colon on CT scan. You spoke with GI and Hematology oncology and refused any further treatment of the mass. You are advised to follow up with your PRIMARY CARE PHYSICIAN within 1 week of being discharged.    Diagnosis: CAD (coronary artery disease)  Assessment and Plan of Treatment: You were previously diagnosed with Coronary Artery disease. Your home medications were held in the setting of possible bleed and anemia. PLAVIX has been DISCONTINUED. You are advised to continue the rest of your medications as previously prescribed.    Diagnosis: HTN (hypertension)  Assessment and Plan of Treatment: You were previously diagnosed with hypertension. You were managed in patient with your home medications. You are advised to maintain a low salt, low cholesterol diet, with regular exercise. You are advised to continue your medications as previously prescribed.    Diagnosis: HLD (hyperlipidemia)  Assessment and Plan of Treatment: You were previously diagnosed with Hyperlipidemia. You were managed in patient with your home medications. You are advised to continue your medications as previously prescribed.     PRINCIPAL DISCHARGE DIAGNOSIS  Diagnosis: Symptomatic anemia  Assessment and Plan of Treatment: You came in with weakness secondary to a low red blood cell count. You have Iron deficiency anemia based on the iron studies taken. This is a result of the mass in the colon that was found on CT scan. You were started on an iron infusion and will be sent home with FERROUS FUMURATE TO BE TAKEN ONCE A DAY. You are advised to follow up with your PRIMARY CARE PHYSICIAN AND GASTROENTEROLOGIST within 1 week of being discharged.      SECONDARY DISCHARGE DIAGNOSES  Diagnosis: Colonic mass  Assessment and Plan of Treatment: A mass was visualized in your colon on CT scan. You spoke with GI and Hematology oncology and refused any further treatment of the mass. You are advised to follow up with your PRIMARY CARE PHYSICIAN AND GASTROENTEROLOGIST within 1 week of being discharged.    Diagnosis: CAD (coronary artery disease)  Assessment and Plan of Treatment: You were previously diagnosed with Coronary Artery disease. Your home medications were held in the setting of possible bleed and anemia. PLAVIX has been DISCONTINUED. You are advised to continue the rest of your medications as previously prescribed. You are advised to follow up with your PRIMARY CARE PHYSICIAN AND CARDIOLOGIST within 1 week of being discharged.    Diagnosis: HTN (hypertension)  Assessment and Plan of Treatment: You were previously diagnosed with hypertension. You were managed in patient with your home medications. You are advised to maintain a low salt, low cholesterol diet, with regular exercise.  You are advised to follow up with your PRIMARY CARE PHYSICIAN AND CARDIOLOGIST within 1 week of being discharged.    Diagnosis: HLD (hyperlipidemia)  Assessment and Plan of Treatment: You were previously diagnosed with Hyperlipidemia. You were managed in patient with your home medications.  You are advised to follow up with your PRIMARY CARE PHYSICIAN AND CARDIOLOGIST within 1 week of being discharged.

## 2024-04-05 NOTE — DISCHARGE NOTE PROVIDER - ATTENDING DISCHARGE PHYSICAL EXAMINATION:
Vital Signs Last 24 Hrs  T(C): 36.5 (05 Apr 2024 12:20), Max: 36.6 (04 Apr 2024 20:20)  T(F): 97.7 (05 Apr 2024 12:20), Max: 97.8 (04 Apr 2024 20:20)  HR: 78 (05 Apr 2024 12:20) (67 - 78)  BP: 118/66 (05 Apr 2024 12:20) (115/72 - 121/61)  BP(mean): --  RR: 17 (05 Apr 2024 12:20) (17 - 18)  SpO2: 97% (05 Apr 2024 12:20) (95% - 97%)    Parameters below as of 05 Apr 2024 12:20  Patient On (Oxygen Delivery Method): room air    PHYSICAL EXAMINATION:  GENERAL: NAD  HEAD:  Atraumatic, Normocephalic  EYES:  conjunctiva and sclera clear  NECK: Supple, No JVD, Normal thyroid  CHEST/LUNG: Clear to auscultation. No rales, rhonchi, wheezing, or rubs  HEART: Regular rate and rhythm; No murmurs, rubs, or gallops  ABDOMEN: Soft, tender in lower quadrants. ; Bowel sounds present  NERVOUS SYSTEM:  Alert & Oriented X3,    EXTREMITIES:  2+ Peripheral Pulses, No clubbing, cyanosis, or edema  SKIN: warm dry

## 2024-04-05 NOTE — DISCHARGE NOTE NURSING/CASE MANAGEMENT/SOCIAL WORK - PATIENT PORTAL LINK FT
You can access the FollowMyHealth Patient Portal offered by [facility] by registering at the following website: [email or web address]. By joining [employer]’s FollowMyHealth portal, you will also be able to view your health information using other applications (apps) compatible with our system.

## 2024-04-05 NOTE — DISCHARGE NOTE PROVIDER - PROVIDER TOKENS
PROVIDER:[TOKEN:[[id number]],FOLLOWUP:[1 week]] PROVIDER:[TOKEN:[[id number]],FOLLOWUP:[1 week]],PROVIDER:[TOKEN:[[id number]],FOLLOWUP:[1 week],ESTABLISHEDPATIENT:[T]],PROVIDER:[TOKEN:[[id number]],FOLLOWUP:[1 week],ESTABLISHEDPATIENT:[T]]

## 2024-04-08 PROBLEM — I25.10 ATHEROSCLEROTIC HEART DISEASE OF NATIVE CORONARY ARTERY WITHOUT ANGINA PECTORIS: Chronic | Status: ACTIVE | Noted: 2024-04-03

## 2024-04-09 ENCOUNTER — APPOINTMENT (OUTPATIENT)
Dept: GASTROENTEROLOGY | Facility: CLINIC | Age: 84
End: 2024-04-09

## 2024-05-22 ENCOUNTER — APPOINTMENT (OUTPATIENT)
Dept: SURGICAL ONCOLOGY | Facility: CLINIC | Age: 84
End: 2024-05-22
Payer: MEDICARE

## 2024-05-22 VITALS
BODY MASS INDEX: 21.97 KG/M2 | SYSTOLIC BLOOD PRESSURE: 109 MMHG | DIASTOLIC BLOOD PRESSURE: 61 MMHG | HEIGHT: 63 IN | HEART RATE: 78 BPM | WEIGHT: 124 LBS | OXYGEN SATURATION: 95 %

## 2024-05-22 DIAGNOSIS — Z95.5 PRESENCE OF CORONARY ANGIOPLASTY IMPLANT AND GRAFT: ICD-10-CM

## 2024-05-22 DIAGNOSIS — Z63.4 DISAPPEARANCE AND DEATH OF FAMILY MEMBER: ICD-10-CM

## 2024-05-22 DIAGNOSIS — K62.89 OTHER SPECIFIED DISEASES OF ANUS AND RECTUM: ICD-10-CM

## 2024-05-22 DIAGNOSIS — Z85.828 PERSONAL HISTORY OF OTHER MALIGNANT NEOPLASM OF SKIN: ICD-10-CM

## 2024-05-22 PROCEDURE — 99204 OFFICE O/P NEW MOD 45 MIN: CPT

## 2024-05-22 SDOH — SOCIAL STABILITY - SOCIAL INSECURITY: DISSAPEARANCE AND DEATH OF FAMILY MEMBER: Z63.4

## 2024-05-31 NOTE — CONSULT LETTER
[Dear  ___] : Dear  [unfilled], [Courtesy Letter:] : I had the pleasure of seeing your patient, [unfilled], in my office today. [Please see my note below.] : Please see my note below. [Referral Closing:] : Thank you very much for seeing this patient.  If you have any questions, please do not hesitate to contact me. [Sincerely,] : Sincerely, [FreeTextEntry3] : Dionisio Sandoval MD, KIRILL, FACS Director of Surgical Oncology- [facility] , Department of Surgery [facility] [place] [id number] Ph: [phone] Cell: [phone]

## 2024-05-31 NOTE — ASSESSMENT
[FreeTextEntry1] : IMP: 85y/o F referred by Dr. Kendall for rectal mass. Patient presented to [facility] for anemia work up. At the time, patient c/o chronic weakness, lower abdominal pain, and chronic constipation with dark stools for months. CT angio A/P showed mass in ascending colon.  PET/CT outpatient showed large hypermetabolic ileocecal mass up to 6.3cm suspicious for colon malignancy. An associated hypermetabolic lesion posterior to the colon mass in the right lower quadrant is most likely a metastatic lymphadenopathy.   PLAN:  -I recommended a colonoscopy and biopsy for tissue diagnosis and evaluate for synchronous primary Followed by right colectomy given the location of the tumor -Pt very hesitant to consider work up and treatment after a long discussion - I have provided my contact info if pt wishes to pursue treatment  I have discussed the diagnosis, therapeutic plan and options with the patient at length. Patient expressed verbal understanding to proceed with proposed plan. All questions answered.

## 2024-05-31 NOTE — HISTORY OF PRESENT ILLNESS
[de-identified] : Ms. Mala Johnson is a 83y/o F referred by Dr. Kendall for rectal mass. Patient presented to [facility] for anemia work up. At the time, patient c/o chronic weakness, lower abdominal pain, and chronic constipation with dark stools for months. CT angio A/P showed mass in ascending colon.  PET/CT outpatient showed large hypermetabolic ileocecal mass up to 6.3cm suspicious for colon malignancy. An associated hypermetabolic lesion posterior to the colon mass in the right lower quadrant is most likely a metastatic lymphadenopathy.

## 2024-10-02 NOTE — H&P ADULT - PROBLEM SELECTOR PLAN 1
Pt presented due to recently Lab report of Hb showing 6  chr. weakness, chr. constipated, hx of questionable chr. dark stool for months. no active bleeding  home meds RF: aspirin and plavix  H/H on adm: 6/20  iron: 12. TIBC wnl. iron sat 4  LDH: wnl  prior colonscopy more than 5 years ago.never had an egd  suspecting source of bleed in likely GI, possibly very minimally bleeding over a long duration  f/u CTA A/P to rule out bleed  f/u haptoglobin (to r/o hemolytic anemia)    NPO as pt is pending GI eval (colonoscopy procedure)  IV PPI  to transfuse 1 u prbc   f/u post transfusion cbc  monitor H/H q12h   Dr. Levin ([facility]) consulted Pt presented due to recently Lab report of Hb showing 6  chr. weakness, chr. constipated, hx of questionable chr. dark stool for months. no active bleeding  home meds RF: aspirin and plavix  H/H on adm: 6/20  iron: 12. TIBC wnl. iron sat 4  LDH: wnl  prior colonscopy more than 5 years ago.never had an egd  suspecting source of bleed in likely GI, possibly very minimally bleeding over a long duration  f/u CTA A/P to rule out bleed  f/u haptoglobin (to r/o hemolytic anemia)    NPO as pt is pending GI eval (colonoscopy procedure)  IV PPI  s/p 1 u prbc   f/u post transfusion cbc at 8pm on 4/3.  monitor H/H q12h   Dr. Levin ([facility]) consulted Reason?: non-covered service Detail Level: Detailed Payment Option: Option 1: Bill Medicare, await for decision on payment. Procedure (Limit To 20 Characters): excision Reason?: Additional Information

## 2024-10-06 NOTE — PROGRESS NOTE ADULT - PROBLEM SELECTOR PLAN 5
DVT ppx: SCD hx of CAD s/p proximal RCA stent in 2021  home med: [id number], isosorbide mono ER 35qd, metoprolol succinate 25 qd, plavix 75  holding home meds as pt is normotensive and Low volume Hb 6 and holding DAPT due to bleeding/low Hb  cards: Dr. Ervin at [facility] in Oct 2021  prior LVEF in 2021: 72%  Dr. Chapman talked to cardiologist  - as per note pt was suppose to be on plavix for only 6 month post stent  - confirmed by cardiologist (4/3): patient can be discharged without DAPT given to family

## 2024-12-10 ENCOUNTER — EMERGENCY (EMERGENCY)
Facility: HOSPITAL | Age: 84
LOS: 1 days | Discharge: HOME | End: 2024-12-10
Attending: STUDENT IN AN ORGANIZED HEALTH CARE EDUCATION/TRAINING PROGRAM
Payer: COMMERCIAL

## 2024-12-10 VITALS
DIASTOLIC BLOOD PRESSURE: 60 MMHG | SYSTOLIC BLOOD PRESSURE: 130 MMHG | TEMPERATURE: 98 F | OXYGEN SATURATION: 98 % | HEART RATE: 68 BPM | WEIGHT: 130.07 LBS | RESPIRATION RATE: 16 BRPM | HEIGHT: 63 IN

## 2024-12-10 VITALS
TEMPERATURE: 98 F | RESPIRATION RATE: 18 BRPM | DIASTOLIC BLOOD PRESSURE: 77 MMHG | SYSTOLIC BLOOD PRESSURE: 147 MMHG | HEART RATE: 73 BPM | OXYGEN SATURATION: 95 %

## 2024-12-10 DIAGNOSIS — Z90.710 ACQUIRED ABSENCE OF BOTH CERVIX AND UTERUS: Chronic | ICD-10-CM

## 2024-12-10 DIAGNOSIS — Z98.890 OTHER SPECIFIED POSTPROCEDURAL STATES: Chronic | ICD-10-CM

## 2024-12-10 LAB
ALBUMIN SERPL ELPH-MCNC: 2.6 G/DL — LOW (ref 3.5–5)
ALP SERPL-CCNC: 85 U/L — SIGNIFICANT CHANGE UP (ref 40–120)
ALT FLD-CCNC: 18 U/L DA — SIGNIFICANT CHANGE UP (ref 10–60)
ANION GAP SERPL CALC-SCNC: 6 MMOL/L — SIGNIFICANT CHANGE UP (ref 5–17)
ANISOCYTOSIS BLD QL: SLIGHT — SIGNIFICANT CHANGE UP
APTT BLD: 28 SEC — SIGNIFICANT CHANGE UP (ref 24.5–35.6)
AST SERPL-CCNC: 16 U/L — SIGNIFICANT CHANGE UP (ref 10–40)
BASOPHILS # BLD AUTO: 0.04 K/UL — SIGNIFICANT CHANGE UP (ref 0–0.2)
BASOPHILS NFR BLD AUTO: 0.8 % — SIGNIFICANT CHANGE UP (ref 0–2)
BILIRUB SERPL-MCNC: 0.2 MG/DL — SIGNIFICANT CHANGE UP (ref 0.2–1.2)
BLD GP AB SCN SERPL QL: SIGNIFICANT CHANGE UP
BUN SERPL-MCNC: 14 MG/DL — SIGNIFICANT CHANGE UP (ref 7–18)
CALCIUM SERPL-MCNC: 8.7 MG/DL — SIGNIFICANT CHANGE UP (ref 8.4–10.5)
CHLORIDE SERPL-SCNC: 108 MMOL/L — SIGNIFICANT CHANGE UP (ref 96–108)
CO2 SERPL-SCNC: 27 MMOL/L — SIGNIFICANT CHANGE UP (ref 22–31)
CREAT SERPL-MCNC: 0.7 MG/DL — SIGNIFICANT CHANGE UP (ref 0.5–1.3)
EGFR: 84 ML/MIN/1.73M2 — SIGNIFICANT CHANGE UP
EOSINOPHIL # BLD AUTO: 0.04 K/UL — SIGNIFICANT CHANGE UP (ref 0–0.5)
EOSINOPHIL NFR BLD AUTO: 0.8 % — SIGNIFICANT CHANGE UP (ref 0–6)
GLUCOSE SERPL-MCNC: 100 MG/DL — HIGH (ref 70–99)
HCT VFR BLD CALC: 18.4 % — CRITICAL LOW (ref 34.5–45)
HGB BLD-MCNC: 5.3 G/DL — CRITICAL LOW (ref 11.5–15.5)
HYPOCHROMIA BLD QL: SLIGHT — SIGNIFICANT CHANGE UP
IMM GRANULOCYTES NFR BLD AUTO: 0.2 % — SIGNIFICANT CHANGE UP (ref 0–0.9)
INR BLD: 1.08 RATIO — SIGNIFICANT CHANGE UP (ref 0.85–1.16)
LYMPHOCYTES # BLD AUTO: 1.01 K/UL — SIGNIFICANT CHANGE UP (ref 1–3.3)
LYMPHOCYTES # BLD AUTO: 19.5 % — SIGNIFICANT CHANGE UP (ref 13–44)
MANUAL SMEAR VERIFICATION: SIGNIFICANT CHANGE UP
MCHC RBC-ENTMCNC: 24.4 PG — LOW (ref 27–34)
MCHC RBC-ENTMCNC: 28.8 G/DL — LOW (ref 32–36)
MCV RBC AUTO: 84.8 FL — SIGNIFICANT CHANGE UP (ref 80–100)
MICROCYTES BLD QL: SLIGHT — SIGNIFICANT CHANGE UP
MONOCYTES # BLD AUTO: 0.63 K/UL — SIGNIFICANT CHANGE UP (ref 0–0.9)
MONOCYTES NFR BLD AUTO: 12.1 % — SIGNIFICANT CHANGE UP (ref 2–14)
NEUTROPHILS # BLD AUTO: 3.46 K/UL — SIGNIFICANT CHANGE UP (ref 1.8–7.4)
NEUTROPHILS NFR BLD AUTO: 66.6 % — SIGNIFICANT CHANGE UP (ref 43–77)
NRBC # BLD: 0 /100 WBCS — SIGNIFICANT CHANGE UP (ref 0–0)
OVALOCYTES BLD QL SMEAR: SLIGHT — SIGNIFICANT CHANGE UP
PLAT MORPH BLD: NORMAL — SIGNIFICANT CHANGE UP
PLATELET # BLD AUTO: 350 K/UL — SIGNIFICANT CHANGE UP (ref 150–400)
POTASSIUM SERPL-MCNC: 3.8 MMOL/L — SIGNIFICANT CHANGE UP (ref 3.5–5.3)
POTASSIUM SERPL-SCNC: 3.8 MMOL/L — SIGNIFICANT CHANGE UP (ref 3.5–5.3)
PROT SERPL-MCNC: 6 G/DL — SIGNIFICANT CHANGE UP (ref 6–8.3)
PROTHROM AB SERPL-ACNC: 12.6 SEC — SIGNIFICANT CHANGE UP (ref 9.9–13.4)
RBC # BLD: 2.17 M/UL — LOW (ref 3.8–5.2)
RBC # BLD: 2.17 M/UL — LOW (ref 3.8–5.2)
RBC # FLD: 17.6 % — HIGH (ref 10.3–14.5)
RBC BLD AUTO: ABNORMAL
RETICS #: 45.6 K/UL — SIGNIFICANT CHANGE UP (ref 25–125)
RETICS/RBC NFR: 2.1 % — SIGNIFICANT CHANGE UP (ref 0.5–2.5)
SODIUM SERPL-SCNC: 141 MMOL/L — SIGNIFICANT CHANGE UP (ref 135–145)
WBC # BLD: 5.19 K/UL — SIGNIFICANT CHANGE UP (ref 3.8–10.5)
WBC # FLD AUTO: 5.19 K/UL — SIGNIFICANT CHANGE UP (ref 3.8–10.5)

## 2024-12-10 PROCEDURE — 36415 COLL VENOUS BLD VENIPUNCTURE: CPT

## 2024-12-10 PROCEDURE — 85730 THROMBOPLASTIN TIME PARTIAL: CPT

## 2024-12-10 PROCEDURE — 86900 BLOOD TYPING SEROLOGIC ABO: CPT

## 2024-12-10 PROCEDURE — 93005 ELECTROCARDIOGRAM TRACING: CPT

## 2024-12-10 PROCEDURE — 99285 EMERGENCY DEPT VISIT HI MDM: CPT

## 2024-12-10 PROCEDURE — P9040: CPT

## 2024-12-10 PROCEDURE — 86850 RBC ANTIBODY SCREEN: CPT

## 2024-12-10 PROCEDURE — 85045 AUTOMATED RETICULOCYTE COUNT: CPT

## 2024-12-10 PROCEDURE — 86901 BLOOD TYPING SEROLOGIC RH(D): CPT

## 2024-12-10 PROCEDURE — 85610 PROTHROMBIN TIME: CPT

## 2024-12-10 PROCEDURE — 86923 COMPATIBILITY TEST ELECTRIC: CPT

## 2024-12-10 PROCEDURE — 80053 COMPREHEN METABOLIC PANEL: CPT

## 2024-12-10 PROCEDURE — 85025 COMPLETE CBC W/AUTO DIFF WBC: CPT

## 2024-12-10 PROCEDURE — 36430 TRANSFUSION BLD/BLD COMPNT: CPT

## 2024-12-10 PROCEDURE — 99285 EMERGENCY DEPT VISIT HI MDM: CPT | Mod: 25

## 2024-12-10 NOTE — ED PROVIDER NOTE - OBJECTIVE STATEMENT
84-year-old female with past medical history anemia (required blood transfusion and iron transfusions in the past), hypertension, hyperlipidemia, CAD status post stents presents with low hemoglobin.  Patient states she follow-up with her hematologist today and was found to have hemoglobin 5.9, advised to the emergency room and for blood transfusion.  Denies any fevers, headache vision change, chest pain, shortness of breath, abdominal pain, vomiting, bloody stools, black tarry stools, dizziness, fainting, or rash.  Last transfusion April 2024.  Denies any additional complaints.

## 2024-12-10 NOTE — ED ADULT NURSE NOTE - NSFALLHARMRISKINTERV_ED_ALL_ED

## 2024-12-10 NOTE — ED PROVIDER NOTE - PHYSICAL EXAMINATION
CONSTITUTIONAL: non-toxic, well appearing  SKIN: no rash, no petechiae.  EYES: pale conjunctiva, anicteric  NECK: Supple; no meningismus, no JVD  CARD: RRR, no murmurs, equal radial pulses bilaterally 2+  RESP: CTAB, no respiratory distress  ABD: Soft, non-tender, non-distended, no peritoneal signs  EXT: Normal ROM x4. 1+ BLE edema.   NEURO: Alert, oriented. Neuro exam nonfocal

## 2024-12-10 NOTE — ED PROVIDER NOTE - IV ALTEPLASE ADMIN OUTSIDE HIDDEN
show What Type Of Note Output Would You Prefer (Optional)?: Bullet Format How Severe Is Your Rash?: mild Is This A New Presentation, Or A Follow-Up?: Rash

## 2024-12-10 NOTE — ED PROVIDER NOTE - CLINICAL SUMMARY MEDICAL DECISION MAKING FREE TEXT BOX
[place]: 84-year-old female with past medical history anemia (required blood transfusion and iron transfusions in the past), hypertension, hyperlipidemia, CAD status post stents presents with low hemoglobin.  Patient states she follow-up with her hematologist today and was found to have hemoglobin 5.9, advised to the emergency room and for blood transfusion.  Denies any fevers, headache vision change, chest pain, shortness of breath, abdominal pain, vomiting, bloody stools, black tarry stools, dizziness, fainting, or rash.  Last transfusion April 2024.  Physical exam per above. Patient hemodynamically stable in NAD, minimally symptomatic. Discussed R/B of pRBC transfusion, pt agreeable, written consent obtained. Plan includes labs, transfuse PRN with dispo pending workup.

## 2024-12-10 NOTE — ED PROVIDER NOTE - PROGRESS NOTE DETAILS
Patient completed transfusion otherwise clinically well no sign distress endorses feeling better instructed to follow-up PMD return precaution instructed clinically stable well-appearing on dc

## 2024-12-10 NOTE — ED PROVIDER NOTE - NSFOLLOWUPINSTRUCTIONS_ED_ALL_ED_FT
Blood Transfusion    WHAT YOU NEED TO KNOW:    What do I need to know about a blood transfusion? A blood transfusion is used to give you blood through an IV. You may get only part of the blood, such as red blood cells, platelets, or plasma. The blood may be from you and stored for you to use later. The blood may instead be from another person. Donated blood is tested for HIV, hepatitis, syphilis, West Nile virus, and other diseases.    How do I prepare for a blood transfusion?    Your healthcare provider will tell you how to prepare. You will be told if you can eat or drink before the transfusion. Ask if you can drive yourself home. You may need to arrange for a ride.    Tell the healthcare provider if you ever had a fever, itching, swelling, or hives during a blood transfusion. You may be given medicines to help prevent an allergic reaction.    Healthcare providers will take a sample of your blood. They will check that the blood used in the transfusion is right for you. You can get sick if your immune system tries to destroy blood that is not right for you. This is called a blood transfusion reaction. Ask your healthcare provider for more information about blood transfusion reactions.    Your transfusion may last 1 to 4 hours. Ask what you can bring into the transfusion room. You may be able to eat, read, or watch TV. You may also be able to go to the restroom with help.  What happens during a blood transfusion?    An IV will be placed into a large vein, usually in your arm. The bag that contains blood will hang next to your bed or chair. Tubing will connect the blood bag to your IV.    The healthcare provider will open a clamp so the blood can enter your IV. The blood transfusion will start slowly so healthcare providers can watch for signs of a reaction. Even a small amount of donor blood can cause a reaction. A healthcare provider will stay with you for at least 15 minutes after the transfusion starts.    Healthcare providers will check your vital signs at least 1 time every hour. Tell them if you have signs of a reaction, such as pain, nausea, or itching. They will stop the transfusion immediately.  What should I expect after a blood transfusion? You may need to have blood taken to check that your body accepted the donor blood. You will have to stay a short time after the transfusion ends so healthcare providers can watch for signs of a reaction. You may feel some pain or see bruises near the site for a few days after the transfusion. Apply ice to decrease pain and swelling. Use an ice pack, or put ice in a plastic bag and wrap a towel around it. Apply the ice pack or wrapped bag to your transfusion site for 20 minutes each hour or as directed.    What are the risks of a blood transfusion? Fever, chills, or mild allergic reactions can happen within hours of a transfusion. You may develop shortness of breath or other breathing problems. A very rare allergic reaction called anaphylaxis may cause you to go into shock and stop breathing. Some reactions may happen days or weeks later. Examples include bruising, tiredness, or weakness. You may also have a reaction the next time you receive blood.    CARE AGREEMENT:    You have the right to help plan your care. Learn about your health condition and how it may be treated. Discuss treatment options with your healthcare providers to decide what care you want to receive. You always have the right to refuse treatment.

## 2024-12-10 NOTE — ED PROVIDER NOTE - IV ALTEPLASE INCLUSION HIDDEN
show Posterior Auricular Interpolation Flap Text: Due to location on free margin and exposed cartilage and to restore structure and function, a decision was made to reconstruct the defect utilizing an interpolation axial flap and a staged reconstruction.  A telfa template was made of the defect.  This telfa template was then used to outline the posterior auricular interpolation flap.  The donor area for the pedicle flap was then injected with anesthesia.  The flap was excised through the skin and subcutaneous tissue down to the layer of the underlying musculature.  The pedicle flap was carefully excised within this deep plane to maintain its blood supply.  The edges of the donor site were undermined.   The pedicle was then moved into position and sutured.  Once the flap was sutured into place, adequate blood supply was confirmed with blanching and refill.  The pedicle was then wrapped with xeroform gauze and dressed appropriately with a telfa and gauze bandage to ensure continued blood supply and protect the attached pedicle.

## 2024-12-10 NOTE — ED ADULT TRIAGE NOTE - HEART RATE (BEATS/MIN)
ANNUAL EXAM NOTE      HISTORY    Reji Chahal is a 45 year old male who presents for an annual exam.    Chief Complaint   Patient presents with   • New Patient     Discuss following up on liver cyst from about 8 years ago   • Annual Exam     Pt is not fasting.    • Colon Cancer Screening     Pt has never had colonoscopy.    • Imm/Inj         MEDICAL HISTORY    Past Medical History:   Diagnosis Date   • Fatty liver        SURGICAL HISTORY    Past Surgical History:   Procedure Laterality Date   • Ablation - supraventricular tachycardia     • Hernia mesh implant vent/incis for open     • Knee scope,diagnostic      x2       SOCIAL HISTORY    Social History     Tobacco Use   • Smoking status: Never   • Smokeless tobacco: Never   Vaping Use   • Vaping status: never used   Substance Use Topics   • Alcohol use: Yes     Alcohol/week: 2.0 - 3.0 standard drinks of alcohol     Types: 2 - 3 Standard drinks or equivalent per week       FAMILY HISTORY    Family History   Problem Relation Age of Onset   • Asthma Mother    • Diabetes Mother    • Myocardial Infarction Father    • BRCA1 Positive Daughter        MEDICATIONS    Current Outpatient Medications   Medication Sig   • MAGNESIUM PO    • Cholecalciferol (VITAMIN D3 PO)    • Omega-3 Fatty Acids (Fish Oil) 1200 MG capsule Take 1,200 mg by mouth.   • fluticasone-salmeterol 250-50 MCG/ACT inhaler Advair Diskus 250 mcg-50 mcg/dose powder for inhalation   INHALE 1 PUFF BY MOUTH EVERY 12 HOURS   • albuterol (ProAir HFA) 108 (90 Base) MCG/ACT inhaler ProAir HFA 90 mcg/actuation aerosol inhaler     No current facility-administered medications for this visit.       ALLERGIES    ALLERGIES:  No Known Allergies    REVIEW OF SYSTEMS    As per HPI.  All other systems reviewed and are negative.    PHYSICAL EXAM    Vital Signs:    Vitals:    05/05/23 0920   BP: 120/86   BP Location: RUE - Right upper extremity   Patient Position: Sitting   Pulse: 67   Resp: 14   Temp: 97.3 °F (36.3 °C)    TempSrc: Temporal   SpO2: 97%   Weight: 103.9 kg (229 lb)   Height: 6' 3\" (1.905 m)   PainSc:  0     General: Appears stated age, in no acute cardiopulmonary distress  HEENT: Head atraumatic, PERRL bilaterally, no deviated nasal septum, throat clear, MMM  Neck: No JVD, no carotid bruits, no neck masses  CVS: S1S2 present, no murmur/rub/gallop, RRR  Respiratory: Normal respiratory effort, lungs clear to auscultation bilaterally, no adventitious breath sounds  GI: Abdomen non-distended, bowel sounds present in all quadrants, non-tender, no organomegaly  Peripheral Vascular: Peripheral pulses are intact bilaterally, no pedal edema  CNS: CN II to XII grossly intact, moving all extremities without any difficulty, normal gait and balance  Musculoskeletal: No significant spine deformities noted, no joint swelling, normal ROM in upper and lower extremities  Psychiatric: Mood and behavior is normal, affect is appropriate.      RESULTS    Pertinent labs and imaging studies reviewed.      ASSESSMENT & PLAN    Reji was seen today for new patient, annual exam, colon cancer screening and imm/inj.    Diagnoses and all orders for this visit:    Annual physical exam  -     Lipid Panel With Reflex; Future  -     Comprehensive Metabolic Panel; Future  -     CBC No Differential; Future  -     Thyroid Stimulating Hormone; Future  -     Vitamin D -25 Hydroxy; Future    Colon cancer screening  -     Open Access COLONOSCOPY    Liver cyst  -     US LIVER GALLBLADDER PANCREAS (RUQ); Future    Moderate persistent asthma without complication  Comments:  Stable.  Monitor.    Encounter for screening for coronary artery disease  -     CT HEART CALCIUM SCORING; Future             Patient verbalized understanding of above assessment and plan. Reassurance provided.    Return to clinic in Return in about 1 year (around 5/5/2024) for annual exam .    Tremayne Boland MD  Internal Medicine  [facility]  Phone: [phone]    Note to  patient: The 21st Century Cures Act makes medical notes like these available to patients in the interest of transparency. However, be advised this is a medical document. It is intended as peer to peer communication. It is written in medical language and may contain abbreviations or verbiage that are unfamiliar. It may appear blunt or direct. Medical documents are intended to carry relevant information, facts as evident, and the clinical opinion of the practitioner. While typing this document, voice recognition software was utilized. This may result in incorrect grammar, misspellings, incorrect pronouns. Effort has been made to avoid this wherever possible, but may be apparent.      68

## 2024-12-10 NOTE — ED ADULT NURSE NOTE - NSFALLCONCLUSION_ED_ALL_ED
PT presents with rapid heart rate and chest pain beginning this AM. Pt reports history of atrial fibrillation requiring 2 ablations  
Fall with Harm Risk

## 2024-12-10 NOTE — ED PROVIDER NOTE - PATIENT PORTAL LINK FT
You can access the FollowMyHealth Patient Portal offered by [facility] by registering at the following website: [email or web address]. By joining [facility]’s FollowMyHealth portal, you will also be able to view your health information using other applications (apps) compatible with our system.

## 2025-02-27 NOTE — ED ADULT TRIAGE NOTE - BP NONINVASIVE DIASTOLIC (MM HG)
[facility] General Surgery Clinic Note          Chief Complaint   Patient presents with    New Patient       Polyp of colon, Family history of colon cancer  Pt states no sx, fam hx in brother          PCP: Vinayak Hawkins DO Daniel P Parks 52 y.o. male   History of Present Illness  The patient presents for a colonoscopy.     He reports no current bowel issues, including the absence of blood in his stool or unexplained weight loss. He has previously undergone a colonoscopy, during which he consumed a small amount of magnesium citrate as part of the preparation process. He has no history of abdominal surgeries. He also mentions that he has a high tolerance for anesthesia, as evidenced by his ability to remain conscious during a hand surgery procedure. His last scope was 5 years ago and he had a sessile adenoma. He just started Monro      FAMILY HISTORY  His brother had colon cancer and was diagnosed approximately 16 to 17 years ago.        Past Medical History        Past Medical History:   Diagnosis Date    Encounter for screening colonoscopy       for OR 8-4-20     Gout      Hypertension              Past Surgical History         Past Surgical History:   Procedure Laterality Date    COLONOSCOPY N/A 8/4/2020     COLONOSCOPY POLYPECTOMY SNARE/COLD BIOPSY performed by Malcom Hernandez DO at [facility] ENDOSCOPY    HAND SURGERY         right     INNER EAR SURGERY        TONSILLECTOMY                Home Medications           Prior to Admission medications    Medication Sig Start Date End Date Taking? Authorizing Provider   metoprolol tartrate (LOPRESSOR) 50 MG tablet TAKE 1 TABLET BY MOUTH TWICE DAILY 1/2/25   Yes Chong Jiménez DO   losartan-hydroCHLOROthiazide (HYZAAR) 100-12.5 MG per tablet TAKE 1 TABLET BY MOUTH DAILY 1/2/25   Yes Chong Jiménez DO   amLODIPine (NORVASC) 5 MG tablet TAKE 1 TABLET BY MOUTH DAILY 1/2/25   Yes Chong Jiménez DO   Tirzepatide (MOUNJARO) 5 MG/0.5ML SOAJ Inject 5 mg into the skin once 
60

## 2025-03-22 ENCOUNTER — INPATIENT (INPATIENT)
Facility: HOSPITAL | Age: 85
LOS: 5 days | Discharge: NURSING, REHAB OR LONG-TERM CARE | DRG: 390 | End: 2025-03-28
Attending: INTERNAL MEDICINE | Admitting: INTERNAL MEDICINE
Payer: COMMERCIAL

## 2025-03-22 VITALS
HEIGHT: 64 IN | OXYGEN SATURATION: 95 % | WEIGHT: 132.28 LBS | HEART RATE: 86 BPM | DIASTOLIC BLOOD PRESSURE: 63 MMHG | SYSTOLIC BLOOD PRESSURE: 123 MMHG | RESPIRATION RATE: 16 BRPM | TEMPERATURE: 98 F

## 2025-03-22 DIAGNOSIS — Z90.710 ACQUIRED ABSENCE OF BOTH CERVIX AND UTERUS: Chronic | ICD-10-CM

## 2025-03-22 DIAGNOSIS — F03.90 UNSPECIFIED DEMENTIA, UNSPECIFIED SEVERITY, WITHOUT BEHAVIORAL DISTURBANCE, PSYCHOTIC DISTURBANCE, MOOD DISTURBANCE, AND ANXIETY: ICD-10-CM

## 2025-03-22 DIAGNOSIS — K56.609 UNSPECIFIED INTESTINAL OBSTRUCTION, UNSPECIFIED AS TO PARTIAL VERSUS COMPLETE OBSTRUCTION: ICD-10-CM

## 2025-03-22 DIAGNOSIS — I25.10 ATHEROSCLEROTIC HEART DISEASE OF NATIVE CORONARY ARTERY WITHOUT ANGINA PECTORIS: ICD-10-CM

## 2025-03-22 DIAGNOSIS — I10 ESSENTIAL (PRIMARY) HYPERTENSION: ICD-10-CM

## 2025-03-22 DIAGNOSIS — Z29.9 ENCOUNTER FOR PROPHYLACTIC MEASURES, UNSPECIFIED: ICD-10-CM

## 2025-03-22 DIAGNOSIS — E78.5 HYPERLIPIDEMIA, UNSPECIFIED: ICD-10-CM

## 2025-03-22 DIAGNOSIS — Z98.890 OTHER SPECIFIED POSTPROCEDURAL STATES: Chronic | ICD-10-CM

## 2025-03-22 DIAGNOSIS — D50.9 IRON DEFICIENCY ANEMIA, UNSPECIFIED: ICD-10-CM

## 2025-03-22 LAB
ALBUMIN SERPL ELPH-MCNC: 2.2 G/DL — LOW (ref 3.5–5)
ALP SERPL-CCNC: 66 U/L — SIGNIFICANT CHANGE UP (ref 40–120)
ALT FLD-CCNC: 23 U/L DA — SIGNIFICANT CHANGE UP (ref 10–60)
ANION GAP SERPL CALC-SCNC: 6 MMOL/L — SIGNIFICANT CHANGE UP (ref 5–17)
ANISOCYTOSIS BLD QL: SLIGHT — SIGNIFICANT CHANGE UP
APTT BLD: 28.9 SEC — SIGNIFICANT CHANGE UP (ref 24.5–35.6)
AST SERPL-CCNC: 31 U/L — SIGNIFICANT CHANGE UP (ref 10–40)
BASOPHILS # BLD AUTO: 0.02 K/UL — SIGNIFICANT CHANGE UP (ref 0–0.2)
BASOPHILS NFR BLD AUTO: 0.2 % — SIGNIFICANT CHANGE UP (ref 0–2)
BILIRUB SERPL-MCNC: 0.4 MG/DL — SIGNIFICANT CHANGE UP (ref 0.2–1.2)
BLD GP AB SCN SERPL QL: SIGNIFICANT CHANGE UP
BUN SERPL-MCNC: 21 MG/DL — HIGH (ref 7–18)
CALCIUM SERPL-MCNC: 8.8 MG/DL — SIGNIFICANT CHANGE UP (ref 8.4–10.5)
CHLORIDE SERPL-SCNC: 100 MMOL/L — SIGNIFICANT CHANGE UP (ref 96–108)
CO2 SERPL-SCNC: 26 MMOL/L — SIGNIFICANT CHANGE UP (ref 22–31)
CREAT SERPL-MCNC: 1 MG/DL — SIGNIFICANT CHANGE UP (ref 0.5–1.3)
EGFR: 55 ML/MIN/1.73M2 — LOW
EGFR: 55 ML/MIN/1.73M2 — LOW
ELLIPTOCYTES BLD QL SMEAR: SLIGHT — SIGNIFICANT CHANGE UP
EOSINOPHIL # BLD AUTO: 0 K/UL — SIGNIFICANT CHANGE UP (ref 0–0.5)
EOSINOPHIL NFR BLD AUTO: 0 % — SIGNIFICANT CHANGE UP (ref 0–6)
GLUCOSE SERPL-MCNC: 119 MG/DL — HIGH (ref 70–99)
HCT VFR BLD CALC: 25.4 % — LOW (ref 34.5–45)
HGB BLD-MCNC: 7.8 G/DL — LOW (ref 11.5–15.5)
HYPOCHROMIA BLD QL: SLIGHT — SIGNIFICANT CHANGE UP
IMM GRANULOCYTES NFR BLD AUTO: 0.3 % — SIGNIFICANT CHANGE UP (ref 0–0.9)
INR BLD: 1.41 RATIO — HIGH (ref 0.85–1.16)
IRON SATN MFR SERPL: 2 % — LOW (ref 15–50)
IRON SATN MFR SERPL: 5 UG/DL — LOW (ref 40–150)
LACTATE SERPL-SCNC: 1.1 MMOL/L — SIGNIFICANT CHANGE UP (ref 0.7–2)
LIDOCAIN IGE QN: 21 U/L — SIGNIFICANT CHANGE UP (ref 13–75)
LYMPHOCYTES # BLD AUTO: 0.77 K/UL — LOW (ref 1–3.3)
LYMPHOCYTES # BLD AUTO: 6.7 % — LOW (ref 13–44)
MANUAL SMEAR VERIFICATION: SIGNIFICANT CHANGE UP
MCHC RBC-ENTMCNC: 26.2 PG — LOW (ref 27–34)
MCHC RBC-ENTMCNC: 30.7 G/DL — LOW (ref 32–36)
MCV RBC AUTO: 85.2 FL — SIGNIFICANT CHANGE UP (ref 80–100)
MICROCYTES BLD QL: SLIGHT — SIGNIFICANT CHANGE UP
MONOCYTES # BLD AUTO: 0.68 K/UL — SIGNIFICANT CHANGE UP (ref 0–0.9)
MONOCYTES NFR BLD AUTO: 6 % — SIGNIFICANT CHANGE UP (ref 2–14)
NEUTROPHILS # BLD AUTO: 9.91 K/UL — HIGH (ref 1.8–7.4)
NEUTROPHILS NFR BLD AUTO: 86.8 % — HIGH (ref 43–77)
NRBC BLD AUTO-RTO: 0 /100 WBCS — SIGNIFICANT CHANGE UP (ref 0–0)
PLAT MORPH BLD: NORMAL — SIGNIFICANT CHANGE UP
PLATELET # BLD AUTO: 497 K/UL — HIGH (ref 150–400)
POTASSIUM SERPL-MCNC: 3.9 MMOL/L — SIGNIFICANT CHANGE UP (ref 3.5–5.3)
POTASSIUM SERPL-SCNC: 3.9 MMOL/L — SIGNIFICANT CHANGE UP (ref 3.5–5.3)
PROT SERPL-MCNC: 5.9 G/DL — LOW (ref 6–8.3)
PROTHROM AB SERPL-ACNC: 16.4 SEC — HIGH (ref 9.9–13.4)
RBC # BLD: 2.98 M/UL — LOW (ref 3.8–5.2)
RBC # BLD: 2.98 M/UL — LOW (ref 3.8–5.2)
RBC # FLD: 18.8 % — HIGH (ref 10.3–14.5)
RBC BLD AUTO: ABNORMAL
RETICS #: 56.3 K/UL — SIGNIFICANT CHANGE UP (ref 25–125)
RETICS/RBC NFR: 1.9 % — SIGNIFICANT CHANGE UP (ref 0.5–2.5)
SODIUM SERPL-SCNC: 132 MMOL/L — LOW (ref 135–145)
TIBC SERPL-MCNC: 233 UG/DL — LOW (ref 250–450)
UIBC SERPL-MCNC: 228 UG/DL — SIGNIFICANT CHANGE UP (ref 110–370)
WBC # BLD: 11.41 K/UL — HIGH (ref 3.8–10.5)
WBC # FLD AUTO: 11.41 K/UL — HIGH (ref 3.8–10.5)

## 2025-03-22 PROCEDURE — 99223 1ST HOSP IP/OBS HIGH 75: CPT | Mod: GC

## 2025-03-22 PROCEDURE — 99285 EMERGENCY DEPT VISIT HI MDM: CPT

## 2025-03-22 PROCEDURE — 74177 CT ABD & PELVIS W/CONTRAST: CPT | Mod: 26

## 2025-03-22 RX ORDER — ACETAMINOPHEN 500 MG/5ML
1000 LIQUID (ML) ORAL ONCE
Refills: 0 | Status: COMPLETED | OUTPATIENT
Start: 2025-03-22 | End: 2025-03-22

## 2025-03-22 RX ORDER — FUROSEMIDE 10 MG/ML
20 INJECTION INTRAMUSCULAR; INTRAVENOUS DAILY
Refills: 0 | Status: DISCONTINUED | OUTPATIENT
Start: 2025-03-22 | End: 2025-03-24

## 2025-03-22 RX ORDER — ONDANSETRON HCL/PF 4 MG/2 ML
4 VIAL (ML) INJECTION EVERY 8 HOURS
Refills: 0 | Status: DISCONTINUED | OUTPATIENT
Start: 2025-03-22 | End: 2025-03-23

## 2025-03-22 RX ORDER — METOPROLOL SUCCINATE 50 MG/1
1 TABLET, EXTENDED RELEASE ORAL
Refills: 0 | DISCHARGE

## 2025-03-22 RX ORDER — ATORVASTATIN CALCIUM 80 MG/1
20 TABLET, FILM COATED ORAL AT BEDTIME
Refills: 0 | Status: DISCONTINUED | OUTPATIENT
Start: 2025-03-22 | End: 2025-03-23

## 2025-03-22 RX ORDER — METOPROLOL SUCCINATE 50 MG/1
25 TABLET, EXTENDED RELEASE ORAL DAILY
Refills: 0 | Status: DISCONTINUED | OUTPATIENT
Start: 2025-03-22 | End: 2025-03-22

## 2025-03-22 RX ORDER — ISOSORBIDE MONONITRATE 60 MG/1
30 TABLET, EXTENDED RELEASE ORAL DAILY
Refills: 0 | Status: DISCONTINUED | OUTPATIENT
Start: 2025-03-22 | End: 2025-03-28

## 2025-03-22 RX ORDER — ACETAMINOPHEN 500 MG/5ML
650 LIQUID (ML) ORAL EVERY 6 HOURS
Refills: 0 | Status: DISCONTINUED | OUTPATIENT
Start: 2025-03-22 | End: 2025-03-23

## 2025-03-22 RX ORDER — DONEPEZIL HYDROCHLORIDE 5 MG/1
5 TABLET ORAL AT BEDTIME
Refills: 0 | Status: DISCONTINUED | OUTPATIENT
Start: 2025-03-22 | End: 2025-03-24

## 2025-03-22 RX ORDER — METOPROLOL SUCCINATE 50 MG/1
25 TABLET, EXTENDED RELEASE ORAL DAILY
Refills: 0 | Status: DISCONTINUED | OUTPATIENT
Start: 2025-03-22 | End: 2025-03-28

## 2025-03-22 RX ADMIN — Medication 400 MILLIGRAM(S): at 19:05

## 2025-03-22 RX ADMIN — Medication 500 MILLILITER(S): at 14:52

## 2025-03-22 RX ADMIN — DONEPEZIL HYDROCHLORIDE 5 MILLIGRAM(S): 5 TABLET ORAL at 22:59

## 2025-03-22 RX ADMIN — Medication 400 MILLIGRAM(S): at 14:52

## 2025-03-22 RX ADMIN — ATORVASTATIN CALCIUM 20 MILLIGRAM(S): 80 TABLET, FILM COATED ORAL at 22:59

## 2025-03-22 NOTE — H&P ADULT - ASSESSMENT
84F, from home, ambulates with cane, PMH HTN, HLD, KG (receives IV iron transfusions with Dr Wes Kendall), CAD with stents, colonic mass (dx in 2024 via CT and PET imaging, has deferred colonoscopy in the past for further work up), ?dementia. Patient appears poor historian. Collateral obtained from daughter at bedside. Presenting with abdominal pain, n/v x1 week. Found to have CTAP showing Long segment tumor probably adenocarcinoma involving the cecum extending to proximal ascending colon and into terminal ileum causing high-grade proximal bowel obstruction. Ascites. Peritoneum carcinomatosis. Metastatic adenopathy. Admitted for malignant bowel obstruction and further GOC discussion. Family does not want any invasive intervention. Family is considering home hospice.

## 2025-03-22 NOTE — H&P ADULT - NSHPPHYSICALEXAM_GEN_ALL_CORE
Vital Signs Last 24 Hrs  T(C): 36.7 (22 Mar 2025 13:19), Max: 36.7 (22 Mar 2025 13:19)  T(F): 98 (22 Mar 2025 13:19), Max: 98 (22 Mar 2025 13:19)  HR: 86 (22 Mar 2025 13:19) (86 - 86)  BP: 123/63 (22 Mar 2025 13:19) (123/63 - 123/63)  BP(mean): --  RR: 16 (22 Mar 2025 13:19) (16 - 16)  SpO2: 95% (22 Mar 2025 13:19) (95% - 95%)    Parameters below as of 22 Mar 2025 13:19  Patient On (Oxygen Delivery Method): room air    GENERAL: NAD  HEAD:  Atraumatic, Normocephalic  EYES: EOMI, PERRLA, conjunctiva and sclera clear  ENMT: +dry mucus membranes. No tonsillar erythema, exudates, or enlargement  NECK: Supple, normal appearance, No JVD; Normal thyroid; Trachea midline  NERVOUS SYSTEM:  Alert & Oriented X2,  Motor Strength 4/5 B/L upper and lower extremities, sensation intact  CHEST/LUNG: Lungs clear to auscultation bilaterally, No rales, rhonchi, wheezing   HEART: Regular rate and rhythm; No murmurs, rubs, or gallops  ABDOMEN: +Soft, TTP R>L, however diffuse. No rebound. Distended. Hypoactive Bowel sounds   EXTREMITIES:  2+ pitting edema b/l. 2+ Peripheral Pulses, No clubbing, cyanosis  LYMPH: No lymphadenopathy noted  SKIN: No rashes or lesions

## 2025-03-22 NOTE — H&P ADULT - ATTENDING COMMENTS
85yo F with pmh of HTN, anemia, CAD s/p stent on ASA, known colonic mass (did not wish to purse additional tx), who presented with 1 week of worsening weakness, iso of watery stools. She notes bowel movements, passing gas today.  At one point noted seeing some blood in her stool as well. Endorses abdominal pain and distention. The pain is 8-9/10 at its most. She notes she had a bit to eat in the morning and the pain worsened after eating. She notes that she wants to focus on being comfortable, does not wish to do any surgery or anything invasive. She would not want to get further blood work or workup. She and her daughter at bedside/HCP, they would want home hospice for her.  Prior admission from April 3-5, 2024 reviewed, came in for weakness, dark stool and anemia with hgb of 6, transfusion given and CT done, found to have mass in the mid ascending colon wall. Heme onc was consulted and patient was not agreeable to getting treatment.      Labs, vitals and imaging reviewed. Afebrile, HR 86, 123/63, 95% RA. Wbc noted 11, hgb 7.8, plt 497, Na 132. CT a/p - Long segment tumor probably adenocarcinoma involving the cecum extending to proximal ascending colon and into terminal ileum causing high-grade proximal bowel obstruction. Ascites. Peritoneum carcinomatosis. Metastatic adenopathy.   PE – elderly female, NAD, RRR, lungs grossly clear, abd softly distended, ttp mainly in lower quadrants, hypoactive bowel sounds, lower extremities with edema noted       A/P   #High grade bowel obstruction   #Ascites   #Peritoneal carcinomatosis   #Metastatic lymphadenopathy   #HTN   #Anemia   #Colonic mass (from cecum extending to prox ascending colon)     -IVF hydration    -pain control, IV tylenol for mild to mod, and IV morphine for severe pain prn   -can consider paracentesis if abdominal ascites worsens but unlikely patient will want it   -surgery consulted by ED, no need for NGT given passing gas   -clear diet for now, can advance if pain is improving   -zofran prn   -palliative care consult as patient is interested in home hospice   -GOC discussion with patient and her daughter/HCP at bedside, they do no want any surgical intervention for the mass/ obstruction, they wish to work only on pain control. No invasive measures, no blood draws, no blood transfusions, no HD, no feeding tube, no ngt, she wishes to be DNR/DNI. She is ok with iv fluids and abx

## 2025-03-22 NOTE — ED ADULT TRIAGE NOTE - CHIEF COMPLAINT QUOTE
Gen weakness ,diarrhea since last week ,h/o anemia ,had received PRBC in the past ,last transfusion last December

## 2025-03-22 NOTE — H&P ADULT - PROBLEM SELECTOR PLAN 5
hx of KG  receives IV iron transfusions with Dr Wes Kendall). Last transfusion 3 wks ago.  As per daughter, Hb 3 weeks ago was 8.3.     - appears baseline Hb ~8.  - consider Heme Onc consult in the AM to resume IV iron.   - maintain active T+S.   - Patient and family like to defer blood transfusion consent for now and consider at later time if necessary.

## 2025-03-22 NOTE — ED ADULT NURSE NOTE - NSFALLRISKINTERV_ED_ALL_ED

## 2025-03-22 NOTE — H&P ADULT - NSHPREVIEWOFSYSTEMS_GEN_ALL_CORE
CONSTITUTIONAL: No fever, weight loss, or fatigue  RESPIRATORY: No cough, wheezing, chills, or hemoptysis; No shortness of breath  CARDIOVASCULAR: No chest pain, palpitations, dizziness, or leg swelling  GASTROINTESTINAL: +abd pain, +nausea, +vomiting. +constipation. No melena or hematochezia.  GENITOURINARY: No dysuria or hematuria, urinary frequency  NEUROLOGICAL: No headaches, memory loss, loss of strength, numbness, or tremors  ENDOCRINE: No polyuria, polydipsia, or heat/cold intolerance  MUSKULOSKELETAL: No muscle aches, joint pains  HEME: no easy bruisability, no tender or enlarged lymph nodes  SKIN: No itching, burning, rashes, or lesions.

## 2025-03-22 NOTE — H&P ADULT - HISTORY OF PRESENT ILLNESS
84F, from home, ambulates with wane, PMH HTN, HLD, KG, CAD with stents, colonic mass (dx in 2024 via CT and PET imaging, has deferred colonoscopy in the past for further work up) 84F, from home, ambulates with cane, PMH HTN, HLD, KG (receives IV iron transfusions with Dr Wes Kendall), CAD with stents, colonic mass (dx in 2024 via CT and PET imaging, has deferred colonoscopy in the past for further work up), ?dementia. Patient appears poor historian. Collateral obtained from daughter at bedside. States that patient has been more forgetful in the past couple of years. Lives alone. Has been having lower abdominal pain for months. However, in the past week, has been having worsening abdominal pain, nausea, and poor appetite. Endorsing "red vomit" however drinks cranberry juice daily. Daughter is also skeptical that it is truly bloody vomit, given that her baseline hemoglobin is 7-8 (at outpatient office was 8.3 three weeks ago and last iron infusion was around that time). No recent abx use. No BM in over 1 week.     Denies fever, chills, chest pain, palpitations, dysuria, numbness or tingling in ext.

## 2025-03-22 NOTE — PATIENT PROFILE ADULT - FUNCTIONAL ASSESSMENT - BASIC MOBILITY 6.
3-calculated by average/Not able to assess (calculate score using [facility] averaging method)  3-calculated by average/Not able to assess (calculate score using [facility] averaging method)

## 2025-03-22 NOTE — ED PROVIDER NOTE - PROGRESS NOTE DETAILS
Lucks-DO: CT showing high-grade SBO secondary to colon mass with associated personal carcinomatosis.  Discussed goals of care with patient and daughter, requesting no aggressive measures, requesting palliative treatment.  Surgery consulted, no indication for NG tube per discussion with surgery house officer, recommending medical admission.  Messaged Dr Worrell regarding palliative consult.  Discussed with hospitalist and MAR regarding admission.

## 2025-03-22 NOTE — CONSULT NOTE ADULT - SUBJECTIVE AND OBJECTIVE BOX
HPI:  84-year-old female with past medical history hypertension, anemia, CAD on aspirin, known colon mass (not pursuing treatment) presents with generalized weakness, worsening of the past week.  Patient reports generalized weakness, occasional vomiting, and watery stools, worsening over the past week.  Patient reports difficulty walking due to feeling so weak.  Patient reports abdominal pain and distention, but denies any fevers, chest pain, shortness of breath, bloody stools, black tarry stools, dysuria, numbness, focal weakness, rash.  Last blood transfusion in December 2024.  Patient states she is having bowel movements and passing flatus.  Denies any additional complaints.  Pt with family member at bedside  acknowledges pt is not interested in any treatment at this time including surgery  pt/family member acknowledge watery bowel function and flatus  no current n/v    < from: CT Abdomen and Pelvis w/ IV Cont (03.22.25 @ 15:41) >  FINDINGS:  LOWER CHEST: Cardiomegaly. Subtle aortic valve calcification right   coronary artery calcification versus stent. Moderate hiatal hernia.    LIVER: The liver is enlarged. No focal liver lesion identified.  BILE DUCTS: Normal caliber.  GALLBLADDER: Within normal limits.  SPLEEN: Within normal limits.  PANCREAS: Within normal limits.  ADRENALS: Within normal limits.  KIDNEYS/URETERS: Normal size. No hydronephrosis. There appear to be   subtle bilateral renal cortical nephrographic defects    BLADDER: Within normal limits.  REPRODUCTIVE ORGANS: Status post hysterectomy. Ovaries not identified in   the side likely status post oophorectomy.    BOWEL: Evidence of bowel obstruction due to an approximately 10 cm tumor   mass involving the cecum and ascending colon. Increased as compared to   prior study 4/4/2024. The cecal pole is distended with liquefied feces.   The tumor mass is probably extending into the terminal ileum. Diameter of   the inferior cecal pole measures 6.4 cm. The small bowel is markedly   distended there is wall thickening of the terminal and distal ileum. No   bowel pneumatosis. No mesenteric venous or portal venous air. Large   bilocular periampullary-second part of duodenum diverticulum.  PERITONEUM/RETROPERITONEUM: Moderate ascites. Infiltration of the omental   fat. 1.0 cm soft tissue omental nodule on the right anteriorly in the   upper pelvis only metastatic. Probable peritoneal carcinomatosis. 1.0 cm   soft tissue nodule in the root of the mesentery.  VESSELS: Within normallimits.  LYMPH NODES: Enlarged right medial pericolic and  mesenteric nodes likely   metastatic.  ABDOMINAL WALL: Within normal limits.  BONES: Chondrocalcinosis of the symphysis pubis and lumbar spine.   Degenerative disc disease L1-L2. Slight retrolisthesis of L1 on L2.    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  Ascites.  Peritoneum carcinomatosis.  Metastatic adenopathy as described above.    < end of copied text >    PAST MEDICAL & SURGICAL HISTORY:  Essential hypertension      HLD (hyperlipidemia)      Squamous cell cancer of lip      CAD (coronary artery disease)      H/O: hysterectomy      H/O knee surgery          Vital Signs Last 24 Hrs  T(C): 36.7 (22 Mar 2025 13:19), Max: 36.7 (22 Mar 2025 13:19)  T(F): 98 (22 Mar 2025 13:19), Max: 98 (22 Mar 2025 13:19)  HR: 86 (22 Mar 2025 13:19) (86 - 86)  BP: 123/63 (22 Mar 2025 13:19) (123/63 - 123/63)  BP(mean): --  RR: 16 (22 Mar 2025 13:19) (16 - 16)  SpO2: 95% (22 Mar 2025 13:19) (95% - 95%)    Parameters below as of 22 Mar 2025 13:19  Patient On (Oxygen Delivery Method): room air                              7.8    11.41 )-----------( 497      ( 22 Mar 2025 14:50 )             25.4     03-22    132[L]  |  100  |  21[H]  ----------------------------<  119[H]  3.9   |  26  |  1.00    Ca    8.8      22 Mar 2025 14:50    TPro  5.9[L]  /  Alb  2.2[L]  /  TBili  0.4  /  DBili  x   /  AST  31  /  ALT  23  /  AlkPhos  66  03-22    PT/INR - ( 22 Mar 2025 14:50 )   PT: 16.4 sec;   INR: 1.41 ratio         PTT - ( 22 Mar 2025 14:50 )  PTT:28.9 sec    PHYSICAL EXAM  General: WN/WD NAD  Neurology: A&Ox3, nonfocal, PHILLIPS x 4  Respiratory: CTA B/L  CV: RRR, S1S2, no murmurs, rubs or gallops  Abdominal: Soft, distended, generalized tenderness throughout, no peritoneal signs  Extremities: No edema, + peripheral pulses        ASSESSMENT/ PLAN:  85 y/o female with Long segment tumor probably adenocarcinoma involving the cecum extending   to proximal ascending colon and into terminal ileum causing high-grade   proximal bowel obstruction.  Ascites.  Peritoneum carcinomatosis.  Metastatic adenopathy   malignant SBO  pt not currently interested in any surgical intervention  admitted med service  discussed with Dr Barlow  rec palliative consult  GOC discussion  family understands and will d/w other family members regarding any potential palliative surg intervention  Dr Barlow will follow

## 2025-03-22 NOTE — H&P ADULT - PROBLEM SELECTOR PLAN 4
hx of CAD with stents.  home meds: aspirin 81    - holding aspirin for now iso concern for hematemesis and borderline Hb.  - if Hb remains stable (baseline appears ~8) can consider restarting aspirin.

## 2025-03-22 NOTE — PATIENT PROFILE ADULT - PATIENT'S PREFERRED PRONOUN
Spoke with patient's mother in regards to appointment. Patient had concerns that the May appointment was too far away and that something may happen to her daughter if she didn't get a refill before then, as she needs the pills for seizures. Was able to get the patient in for an earlier appointment. Patient was thankful.     Her/She

## 2025-03-22 NOTE — ED PROVIDER NOTE - PHYSICAL EXAMINATION
CONSTITUTIONAL: non-toxic, well appearing  SKIN: no rash, no petechiae.  EYES: pink conjunctiva, anicteric  ENT: tongue and uvular midline, no exudates, mildly dry mucous membranes  NECK: Supple; no meningismus, no JVD  CARD: RRR, no murmurs, equal radial pulses bilaterally 2+  RESP: CTAB, no respiratory distress  ABD: Firm, diffusely tender, abdomen distended, + peritoneal signs  EXT: Normal ROM x4. 1+ BLE edema, chronic per pt.  NEURO: Alert, oriented. Neuro exam nonfocal  PSYCH: Cooperative, appropriate.

## 2025-03-22 NOTE — ED ADULT NURSE NOTE - OBJECTIVE STATEMENT
Pt c/o right lower quadrant pain w/ diarrhea, vomiting, weakness x2 weeks. Pt pmh: hypertension, hyperlipidemia, stent.

## 2025-03-22 NOTE — ED PROVIDER NOTE - OBJECTIVE STATEMENT
84-year-old female with past medical history hypertension, anemia, CAD on aspirin, known colon mass (not pursuing treatment) presents with generalized weakness, worsening of the past week.  Patient reports generalized weakness, occasional vomiting, and watery stools, worsening over the past week.  Patient reports difficulty walking due to feeling so weak.  Patient reports abdominal pain and distention, but denies any fevers, chest pain, shortness of breath, bloody stools, black tarry stools, dysuria, numbness, focal weakness, rash.  Last blood transfusion in December 2024.  Patient states she is having bowel movements and passing flatus.  Denies any additional complaints.

## 2025-03-22 NOTE — H&P ADULT - PROBLEM SELECTOR PLAN 1
Presenting with abdominal pain, n/v x1 week.   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 Ascites. Peritoneum carcinomatosis. Metastatic adenopathy.    - malignant bowel obstruction, metastatic disease, peritoneal carcinomatosis.   - Surgery following.   - Patient and family not currently interested in any surgical intervention. Goal is comfort-focused care (see discussion above). Amendable to abx and IVF. Would like to defer blood transfusion consent for now and consider at later time if necessary. Does not want NG tube. Family would appreciate home hospice as option.  - Palliative consult in the AM.  - pain control. Only wants Tylenol.  - IVF.  - PPI.   - zofran PRN nausea.  - clear liquid diet. Advance as tolerated.
right eye discharge and itchiness

## 2025-03-22 NOTE — ED PROVIDER NOTE - CLINICAL SUMMARY MEDICAL DECISION MAKING FREE TEXT BOX
[place]: 84-year-old female with past medical history hypertension, anemia, CAD on aspirin, known colon mass (not pursuing treatment) presents with generalized weakness, worsening of the past week.  Patient reports generalized weakness, occasional vomiting, and watery stools, worsening over the past week.  Patient reports difficulty walking due to feeling so weak.  Patient reports abdominal pain and distention, but denies any fevers, chest pain, shortness of breath, bloody stools, black tarry stools, dysuria, numbness, focal weakness, rash.  Last blood transfusion in December 2024.  Patient states she is having bowel movements and passing flatus.  Physical exam per above. Abdomen tender and distended, concern for large bowel obstruction or perforation, ddx also includes infectious, metabolic, anemia. Will obtain labs, imaging, supportive treatment, anticipate admission.

## 2025-03-22 NOTE — H&P ADULT - CONVERSATION DETAILS
An extensive goals of care conversation was held including options, risks and benefits of many medical treatments including CPR, intubation, and tube feeding, as well as the metastatic findings and large colonic mass burden. Patient and family want to be DNR/DNI, with the goal of comfort-focused care and possible home hospice. They do not want surgical intervention, including NG tube or further workup of the colonic mass. They want antibiotics and IV fluids to be continued. They would like to defer consenting for blood transfusion at this time. Patient and family aware that Hb is borderline at 7.8, and the risks of low hemoglobin and possibility that it will drop further during this admission. They would like to revisit this blood consent discussion at a later time. A MOLST has been completed to this effect to reflect DNR/DNI. Palliative care to be consulted for discussion of home hospice.

## 2025-03-23 LAB
ALBUMIN SERPL ELPH-MCNC: 2 G/DL — LOW (ref 3.5–5)
ALP SERPL-CCNC: 73 U/L — SIGNIFICANT CHANGE UP (ref 40–120)
ALT FLD-CCNC: 19 U/L DA — SIGNIFICANT CHANGE UP (ref 10–60)
ANION GAP SERPL CALC-SCNC: 6 MMOL/L — SIGNIFICANT CHANGE UP (ref 5–17)
AST SERPL-CCNC: 25 U/L — SIGNIFICANT CHANGE UP (ref 10–40)
BASOPHILS # BLD AUTO: 0.02 K/UL — SIGNIFICANT CHANGE UP (ref 0–0.2)
BASOPHILS NFR BLD AUTO: 0.2 % — SIGNIFICANT CHANGE UP (ref 0–2)
BILIRUB SERPL-MCNC: 0.4 MG/DL — SIGNIFICANT CHANGE UP (ref 0.2–1.2)
BUN SERPL-MCNC: 18 MG/DL — SIGNIFICANT CHANGE UP (ref 7–18)
CALCIUM SERPL-MCNC: 8.7 MG/DL — SIGNIFICANT CHANGE UP (ref 8.4–10.5)
CHLORIDE SERPL-SCNC: 100 MMOL/L — SIGNIFICANT CHANGE UP (ref 96–108)
CO2 SERPL-SCNC: 26 MMOL/L — SIGNIFICANT CHANGE UP (ref 22–31)
CREAT SERPL-MCNC: 0.74 MG/DL — SIGNIFICANT CHANGE UP (ref 0.5–1.3)
EGFR: 79 ML/MIN/1.73M2 — SIGNIFICANT CHANGE UP
EGFR: 79 ML/MIN/1.73M2 — SIGNIFICANT CHANGE UP
EOSINOPHIL # BLD AUTO: 0 K/UL — SIGNIFICANT CHANGE UP (ref 0–0.5)
EOSINOPHIL NFR BLD AUTO: 0 % — SIGNIFICANT CHANGE UP (ref 0–6)
FERRITIN SERPL-MCNC: 55 NG/ML — SIGNIFICANT CHANGE UP (ref 13–330)
GLUCOSE SERPL-MCNC: 95 MG/DL — SIGNIFICANT CHANGE UP (ref 70–99)
HCT VFR BLD CALC: 25 % — LOW (ref 34.5–45)
HGB BLD-MCNC: 7.6 G/DL — LOW (ref 11.5–15.5)
IMM GRANULOCYTES NFR BLD AUTO: 0.4 % — SIGNIFICANT CHANGE UP (ref 0–0.9)
LYMPHOCYTES # BLD AUTO: 0.42 K/UL — LOW (ref 1–3.3)
LYMPHOCYTES # BLD AUTO: 3.4 % — LOW (ref 13–44)
MAGNESIUM SERPL-MCNC: 1.8 MG/DL — SIGNIFICANT CHANGE UP (ref 1.6–2.6)
MCHC RBC-ENTMCNC: 25.9 PG — LOW (ref 27–34)
MCHC RBC-ENTMCNC: 30.4 G/DL — LOW (ref 32–36)
MCV RBC AUTO: 85 FL — SIGNIFICANT CHANGE UP (ref 80–100)
MONOCYTES # BLD AUTO: 0.24 K/UL — SIGNIFICANT CHANGE UP (ref 0–0.9)
MONOCYTES NFR BLD AUTO: 1.9 % — LOW (ref 2–14)
NEUTROPHILS # BLD AUTO: 11.6 K/UL — HIGH (ref 1.8–7.4)
NEUTROPHILS NFR BLD AUTO: 94.1 % — HIGH (ref 43–77)
NRBC BLD AUTO-RTO: 0 /100 WBCS — SIGNIFICANT CHANGE UP (ref 0–0)
PHOSPHATE SERPL-MCNC: 2.8 MG/DL — SIGNIFICANT CHANGE UP (ref 2.5–4.5)
PLATELET # BLD AUTO: 433 K/UL — HIGH (ref 150–400)
POTASSIUM SERPL-MCNC: 3.6 MMOL/L — SIGNIFICANT CHANGE UP (ref 3.5–5.3)
POTASSIUM SERPL-SCNC: 3.6 MMOL/L — SIGNIFICANT CHANGE UP (ref 3.5–5.3)
PROT SERPL-MCNC: 5.6 G/DL — LOW (ref 6–8.3)
RBC # BLD: 2.94 M/UL — LOW (ref 3.8–5.2)
RBC # FLD: 18.7 % — HIGH (ref 10.3–14.5)
SODIUM SERPL-SCNC: 132 MMOL/L — LOW (ref 135–145)
WBC # BLD: 12.33 K/UL — HIGH (ref 3.8–10.5)
WBC # FLD AUTO: 12.33 K/UL — HIGH (ref 3.8–10.5)

## 2025-03-23 PROCEDURE — 99233 SBSQ HOSP IP/OBS HIGH 50: CPT

## 2025-03-23 PROCEDURE — 99223 1ST HOSP IP/OBS HIGH 75: CPT

## 2025-03-23 RX ORDER — SODIUM CHLORIDE 9 G/1000ML
500 INJECTION, SOLUTION INTRAVENOUS
Refills: 0 | Status: DISCONTINUED | OUTPATIENT
Start: 2025-03-23 | End: 2025-03-23

## 2025-03-23 RX ORDER — ACETAMINOPHEN 500 MG/5ML
650 LIQUID (ML) ORAL EVERY 6 HOURS
Refills: 0 | Status: DISCONTINUED | OUTPATIENT
Start: 2025-03-23 | End: 2025-03-28

## 2025-03-23 RX ADMIN — FUROSEMIDE 20 MILLIGRAM(S): 10 INJECTION INTRAMUSCULAR; INTRAVENOUS at 06:00

## 2025-03-23 RX ADMIN — DONEPEZIL HYDROCHLORIDE 5 MILLIGRAM(S): 5 TABLET ORAL at 22:32

## 2025-03-23 RX ADMIN — METOPROLOL SUCCINATE 25 MILLIGRAM(S): 50 TABLET, EXTENDED RELEASE ORAL at 06:00

## 2025-03-23 RX ADMIN — Medication 40 MILLIGRAM(S): at 08:17

## 2025-03-23 RX ADMIN — ISOSORBIDE MONONITRATE 30 MILLIGRAM(S): 60 TABLET, EXTENDED RELEASE ORAL at 12:49

## 2025-03-23 NOTE — CONSULT NOTE ADULT - CONVERSATION DETAILS
pt seen at bedside - aware she has likely colon cancer with partial obstruction - "it's best I don't do surgery, I should do hospice and I want to be at home." She looks to her daughter for support. She appears no acute distress, she feels better drinking clears not instead of solid foods.     Prognosis appears to be weeks.

## 2025-03-23 NOTE — DISCHARGE NOTE PROVIDER - NSDCMRMEDTOKEN_GEN_ALL_CORE_FT
Aspirin Enteric Coated 81 mg oral delayed release tablet: 1 tab(s) orally once a day  atorvastatin 20 mg oral tablet: 1 tab(s) orally once a day  Calcium 600+D 600 mg-5 mcg (200 intl units) oral tablet: 2 tab(s) orally once a day  Cranberry oral tablet: 2 tab(s) orally once a day  donepezil 5 mg oral tablet: 1 tab(s) orally once a day  Fish Oil 500 mg oral capsule: 1 cap(s) orally once a day  furosemide 20 mg oral tablet: 1 tab(s) orally once a day  isosorbide mononitrate 30 mg oral tablet, extended release: 1 tab(s) orally once a day (in the morning)  metoprolol tartrate 25 mg oral tablet: 1 tab(s) orally once a day  MiraLax oral powder for reconstitution: 17 gram(s) orally once a day   isosorbide mononitrate 30 mg oral tablet, extended release: 1 tab(s) orally once a day (in the morning)  metoprolol tartrate 25 mg oral tablet: 1 tab(s) orally once a day

## 2025-03-23 NOTE — CONSULT NOTE ADULT - SUBJECTIVE AND OBJECTIVE BOX
Consult to: Discuss complex medical decision making related to goals of care    [facility] Geriatric and Palliative Consult Service:  Jessica Fleischer-Black MD: via [employer] Teams  Alexis Plata NP: cell ([phone])   Archie Bell LMSW: cell ([phone])   Chase Mar MD: cell ([phone])  Marietta Anaid DO: cell ([phone])    Can contact any Palliative Team member via Microsoft Teams for consults and questions    HPI:  84F, from home, ambulates with cane, PMH HTN, HLD, KG (receives IV iron transfusions with Dr Wes Kendall), CAD with stents, colonic mass (dx in 2024 via CT and PET imaging, has deferred colonoscopy in the past for further work up), ?dementia. Patient appears poor historian. Collateral obtained from daughter at bedside. States that patient has been more forgetful in the past couple of years. Lives alone. Has been having lower abdominal pain for months. However, in the past week, has been having worsening abdominal pain, nausea, and poor appetite. Endorsing "red vomit" however drinks cranberry juice daily. Daughter is also skeptical that it is truly bloody vomit, given that her baseline hemoglobin is 7-8 (at outpatient office was 8.3 three weeks ago and last iron infusion was around that time). No recent abx use. No BM in over 1 week.     Denies fever, chills, chest pain, palpitations, dysuria, numbness or tingling in ext.      (22 Mar 2025 18:03)      PAST MEDICAL & SURGICAL HISTORY:  Essential hypertension      HLD (hyperlipidemia)      Squamous cell cancer of lip      CAD (coronary artery disease)      KG (iron deficiency anemia)      Colonic mass      H/O: hysterectomy      H/O knee surgery          SOCIAL HISTORY:    Admitted from:  home  (with HHA)           assisted living          [facility]       LTC   [ none ] Substance abuse, [ none ] Tobacco hx, [ none ] Alcohol hx, [ none ] Home Opioid Hx  Spiritism:  Language preferred:    FAMILY HISTORY:  FH: CHF (congestive heart failure) (Mother)     unable to obtain from patient due to poor mentation, family unable to give information, see H&P for history  Baseline ADLs (prior to admission):    Allergies    penicillin (Rash)    Intolerances      Review of Systems: [All others negative or Unable to obtain due to poor mentation]  Present Symptoms: Mild, Moderate, Severe  Pain:             Location -                               Aggravating factors -             Quality -             Radiation -             Timing-             Severity (0-10 scale):             Minimal acceptable level (0-10 scale):  Fatigue:  Nausea:  Lack of Appetite:   SOB:  Depression:  Anxiety:  Constipation:     CPOT:    https://[email or web address].org/resources/Documents/Sedation%20Analgesia%20Delirium%20in%20CC/CCS%20STH%20Guideline%20template%20CPOT.pdf  PAIN AD Score:   [email or web address] (press ctrl +  left click to view)      MEDICATIONS  (STANDING):  atorvastatin 20 milliGRAM(s) Oral at bedtime  donepezil 5 milliGRAM(s) Oral at bedtime  furosemide    Tablet 20 milliGRAM(s) Oral daily  isosorbide   mononitrate ER Tablet (IMDUR) 30 milliGRAM(s) Oral daily  metoprolol succinate ER 25 milliGRAM(s) Oral daily  pantoprazole  Injectable 40 milliGRAM(s) IV Push daily    MEDICATIONS  (PRN):  acetaminophen     Tablet .. 650 milliGRAM(s) Oral every 6 hours PRN Mild Pain (1 - 3)  morphine  - Injectable 2 milliGRAM(s) IV Push every 6 hours PRN Severe Pain (7 - 10)  ondansetron Injectable 4 milliGRAM(s) IV Push every 8 hours PRN Nausea and/or Vomiting      PHYSICAL EXAM:  Vital Signs Last 24 Hrs  T(C): 36.8 (23 Mar 2025 13:52), Max: 36.8 (22 Mar 2025 20:10)  T(F): 98.2 (23 Mar 2025 13:52), Max: 98.2 (22 Mar 2025 20:10)  HR: 89 (23 Mar 2025 13:52) (74 - 97)  BP: 120/65 (23 Mar 2025 13:52) (103/62 - 120/65)  BP(mean): 83 (23 Mar 2025 13:52) (83 - 83)  RR: 18 (23 Mar 2025 13:52) (18 - 18)  SpO2: 96% (23 Mar 2025 13:52) (91% - 96%)    Parameters below as of 23 Mar 2025 13:52  Patient On (Oxygen Delivery Method): room air        General: alert  oriented x ____    lethargic distressed cachexia  nonverbal  unarousable verbal    Palliative Performance Scale/Karnofsky Score:  http://npcrc.org/files/news/palliative_performance_scale_ppsv2.pdf    HEENT: no abnormal lesion, dry mouth  ET tube/trach oral lesions:  Lungs: tachypnea/labored breathing, audible excessive secretions  CV: RRR, S1S2, tachycardia  GI: soft non distended non tender  incontinent               PEG/NG/OG tube  constipation  last BM:   : incontinent  oliguria/anuria  daugherty  Musculoskeletal: weakness x4 edema x4    ambulatory with assistance   mostly/fully bedbound/wheelchair bound  Skin: no abnormal skin lesions, poor skin turgor, pressure ulcer stage:   Neuro: no deficits, mild cognitive impairment dsyphagia/dysarthria paresis  Oral intake ability: unable/only mouth care, minimal moderate full capability    LABS:                        7.6    12.33 )-----------( 433      ( 23 Mar 2025 07:04 )             25.0     03-23    132[L]  |  100  |  18  ----------------------------<  95  3.6   |  26  |  0.74    Ca    8.7      23 Mar 2025 07:04  Phos  2.8     03-23  Mg     1.8     03-23    TPro  5.6[L]  /  Alb  2.0[L]  /  TBili  0.4  /  DBili  x   /  AST  25  /  ALT  19  /  AlkPhos  73  03-23    Urinalysis Basic - ( 23 Mar 2025 07:04 )    Color: x / Appearance: x / SG: x / pH: x  Gluc: 95 mg/dL / Ketone: x  / Bili: x / Urobili: x   Blood: x / Protein: x / Nitrite: x   Leuk Esterase: x / RBC: x / WBC x   Sq Epi: x / Non Sq Epi: x / Bacteria: x        RADIOLOGY & ADDITIONAL STUDIES:     Consult to: Discuss complex medical decision making related to goals of care    [facility] Geriatric and Palliative Consult Service:  Jessica Fleischer-Black MD: via [employer] Teams  Alexis Plata NP: cell ([phone])   Archie Bell LMSW: cell ([phone])   Chase Mar MD: cell ([phone])  Marietta Anaid DO: cell ([phone])    Can contact any Palliative Team member via Microsoft Teams for consults and questions    HPI:  84F, from home, ambulates with cane, PMH HTN, HLD, KG (receives IV iron transfusions with Dr Wes Kendall), CAD with stents, colonic mass (dx in 2024 via CT and PET imaging, has deferred colonoscopy in the past for further work up), ?dementia. Patient appears poor historian. Collateral obtained from daughter at bedside. States that patient has been more forgetful in the past couple of years. Lives alone. Has been having lower abdominal pain for months. However, in the past week, has been having worsening abdominal pain, nausea, and poor appetite. Endorsing "red vomit" however drinks cranberry juice daily. Daughter is also skeptical that it is truly bloody vomit, given that her baseline hemoglobin is 7-8 (at outpatient office was 8.3 three weeks ago and last iron infusion was around that time). No recent abx use. No BM in over 1 week.     Denies fever, chills, chest pain, palpitations, dysuria, numbness or tingling in ext.      (22 Mar 2025 18:03)      PAST MEDICAL & SURGICAL HISTORY:  Essential hypertension      HLD (hyperlipidemia)      Squamous cell cancer of lip      CAD (coronary artery disease)      KG (iron deficiency anemia)      Colonic mass      H/O: hysterectomy      H/O knee surgery          SOCIAL HISTORY:    Admitted from:  home  (with HHA)           assisted living          [facility]       LTC   [ none ] Substance abuse, [ none ] Tobacco hx, [ none ] Alcohol hx, [ none ] Home Opioid Hx  Taoist:  Language preferred:    FAMILY HISTORY:  FH: CHF (congestive heart failure) (Mother)     unable to obtain from patient due to poor mentation, family unable to give information, see H&P for history  Baseline ADLs (prior to admission):    Allergies    penicillin (Rash)    Intolerances      Review of Systems: [All others negative or Unable to obtain due to poor mentation]  Present Symptoms: Mild, Moderate, Severe  Pain: mild             Location -    abd                           Aggravating factors - any palpation or movement             Quality - sharp and aching             Radiation - none             Timing- constant             Severity (0-10 scale): 3-6             Minimal acceptable level (0-10 scale): N/A  Fatigue: moderate  Nausea: no more  Lack of Appetite: moderate to severe  SOB: none  Depression: not assessed  Anxiety: none  Constipation: mild     CPOT:    https://ccs-st.org/resources/Documents/Sedation%20Analgesia%20Delirium%20in%20CC/CCS%20STH%20Guideline%20template%20CPOT.pdf  PAIN AD Score: 1  [email or web address] (press ctrl +  left click to view)      MEDICATIONS  (STANDING):  atorvastatin 20 milliGRAM(s) Oral at bedtime  donepezil 5 milliGRAM(s) Oral at bedtime  furosemide    Tablet 20 milliGRAM(s) Oral daily  isosorbide   mononitrate ER Tablet (IMDUR) 30 milliGRAM(s) Oral daily  metoprolol succinate ER 25 milliGRAM(s) Oral daily  pantoprazole  Injectable 40 milliGRAM(s) IV Push daily    MEDICATIONS  (PRN):  acetaminophen     Tablet .. 650 milliGRAM(s) Oral every 6 hours PRN Mild Pain (1 - 3)  morphine  - Injectable 2 milliGRAM(s) IV Push every 6 hours PRN Severe Pain (7 - 10)  ondansetron Injectable 4 milliGRAM(s) IV Push every 8 hours PRN Nausea and/or Vomiting      PHYSICAL EXAM:  Vital Signs Last 24 Hrs  T(C): 36.8 (23 Mar 2025 13:52), Max: 36.8 (22 Mar 2025 20:10)  T(F): 98.2 (23 Mar 2025 13:52), Max: 98.2 (22 Mar 2025 20:10)  HR: 89 (23 Mar 2025 13:52) (74 - 97)  BP: 120/65 (23 Mar 2025 13:52) (103/62 - 120/65)  BP(mean): 83 (23 Mar 2025 13:52) (83 - 83)  RR: 18 (23 Mar 2025 13:52) (18 - 18)  SpO2: 96% (23 Mar 2025 13:52) (91% - 96%)    Parameters below as of 23 Mar 2025 13:52  Patient On (Oxygen Delivery Method): room air        General: alert  oriented x 2  cachexia verbal    Palliative Performance Scale/Karnofsky Score: 30%  http://[email or web address].org/files/news/palliative_performance_scale_[email or web address].pdf    HEENT: no abnormal lesion, dry mouth    Lungs: NO tachypnea/labored breathing, NO audible excessive secretions  CV: RRR, S1S2,   GI: soft/semi hard distended tender   constipation  last BM: yesterday  : continent   Musculoskeletal: weakness x4 ambulatory with assistance   mostly bedbound/wheelchair bound  Skin: no abnormal skin lesions, poor skin turgor  Neuro: no other deficits except, mild cognitive impairment   Oral intake ability: minimal capability    LABS:                        7.6    12.33 )-----------( 433      ( 23 Mar 2025 07:04 )             25.0     03-23    132[L]  |  100  |  18  ----------------------------<  95  3.6   |  26  |  0.74    Ca    8.7      23 Mar 2025 07:04  Phos  2.8     03-23  Mg     1.8     03-23    TPro  5.6[L]  /  Alb  2.0[L]  /  TBili  0.4  /  DBili  x   /  AST  25  /  ALT  19  /  AlkPhos  73  03-23    Urinalysis Basic - ( 23 Mar 2025 07:04 )    Color: x / Appearance: x / SG: x / pH: x  Gluc: 95 mg/dL / Ketone: x  / Bili: x / Urobili: x   Blood: x / Protein: x / Nitrite: x   Leuk Esterase: x / RBC: x / WBC x   Sq Epi: x / Non Sq Epi: x / Bacteria: x        RADIOLOGY & ADDITIONAL STUDIES: reviewed

## 2025-03-23 NOTE — DISCHARGE NOTE PROVIDER - DETAILS OF MALNUTRITION DIAGNOSIS/DIAGNOSES
This patient has been assessed with a concern for Malnutrition and was treated during this hospitalization for the following Nutrition diagnosis/diagnoses:     -  03/24/2025: Moderate protein-calorie malnutrition

## 2025-03-23 NOTE — PROGRESS NOTE ADULT - NS ATTEND AMEND GEN_ALL_CORE FT
Partial SBO due to colon Ca. Patient does not desire any intervention.   Plan per medicine and palliative team.

## 2025-03-23 NOTE — DISCHARGE NOTE PROVIDER - NSDCCPCAREPLAN_GEN_ALL_CORE_FT
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      SECONDARY DISCHARGE DIAGNOSES  Diagnosis: Peritoneal carcinomatosis  Assessment and Plan of Treatment: This was a finding in your CT  You were evaluated by surgery but you and your family opted for comfort-focused care and no surgical intervention  You were evaluated by palliative team ?????????    Diagnosis: HTN (hypertension)  Assessment and Plan of Treatment: Continue metoprolol at home  Follow up with primary care provider to establish long term goal for your blood pressure and for long term management and monitoring  Notify your doctor if you have any of the following symptoms:   Dizziness, Lightheadedness, Blurry vision, Headache, Chest pain, Shortness of breath    Diagnosis: CAD (coronary artery disease)  Assessment and Plan of Treatment:     Diagnosis: KG (iron deficiency anemia)  Assessment and Plan of Treatment: You receive IV iron transfusions with Dr Wes Kendall). Last transfusion 3 wks ago.  As per daughter, Hb 3 weeks ago was 8.3.    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  You were evaluated by surgery but you and your family opted for comfort-focused care and no surgical intervention  Continue puree diet as tolerated      SECONDARY DISCHARGE DIAGNOSES  Diagnosis: Peritoneal carcinomatosis  Assessment and Plan of Treatment: This was a finding in your CT  You were evaluated by surgery but you and your family opted for comfort-focused care and no surgical intervention  You were evaluated by palliative team  Continue comfort measusres    Diagnosis: HTN (hypertension)  Assessment and Plan of Treatment: Continue metoprolol at home  Follow up with primary care provider to establish long term goal for your blood pressure and for long term management and monitoring  Notify your doctor if you have any of the following symptoms:   Dizziness, Lightheadedness, Blurry vision, Headache, Chest pain, Shortness of breath    Diagnosis: HLD (hyperlipidemia)  Assessment and Plan of Treatment: Continue medications as tolerated    Diagnosis: KG (iron deficiency anemia)  Assessment and Plan of Treatment: You receive IV iron transfusions with Dr Wes Kendall). Last transfusion 3 wks ago.  As per daughter, Hb 3 weeks ago was 8.3.    Diagnosis: Dementia  Assessment and Plan of Treatment: Continue home medication

## 2025-03-23 NOTE — DISCHARGE NOTE PROVIDER - CARE PROVIDER_API CALL
Soila Sauceda  Internal Medicine  [address] [place] [id number]  Phone: [phone]  Fax: [phone]  Follow Up Time:

## 2025-03-23 NOTE — DISCHARGE NOTE PROVIDER - ATTENDING DISCHARGE PHYSICAL EXAMINATION:
Alert, cooperative woman in NAD  Vital Signs Last 24 Hrs  T(C): 36.4 (28 Mar 2025 17:05), Max: 36.9 (28 Mar 2025 13:10)  T(F): 97.5 (28 Mar 2025 17:05), Max: 98.5 (28 Mar 2025 13:10)  HR: 95 (28 Mar 2025 17:05) (81 - 95)  BP: 126/70 (28 Mar 2025 17:05) (126/70 - 133/73)  BP(mean): 89 (28 Mar 2025 17:05) (89 - 90)  RR: 18 (28 Mar 2025 17:05) (16 - 18)  SpO2: 92% (28 Mar 2025 17:05) (92% - 95%)    Parameters below as of 28 Mar 2025 17:05  Patient On (Oxygen Delivery Method): nasal cannula  O2 Flow (L/min): 3  Neck, supple  Lungs, clear  Cor, RRR  ABdomen,  miliary masses are palpable  slight tenderness, no rebound  Neurological, intact

## 2025-03-23 NOTE — PROGRESS NOTE ADULT - ASSESSMENT
Patient seen and examined at bedside this morning. Notes still with mild abdominal pain which hurts with palpitation, but was able to tolerate liquids this morning without the pain increasing. Overall says she feels comfortably in bed currently.  AM lab, vitals reviewed as above. VSS, afebrile. Hgb 7.6  PE - elderly female, nontoxic appearing, NC/AT, RRR, +s1/s2, lungs grossly clear, abd diffusely ttp, extremites wwp, b/l edema noted.    A/P  85yo F with pmh of HTN, anemia, CAD s/p stent on ASA, known colonic mass (did not wish to purse additional tx), who presented with 1 week of worsening weakness, iso of watery stools. She notes bowel movements, passing gas today.  At one point noted seeing some blood in her stool as well. Endorses abdominal pain and distention. The pain is 8-9/10 at its most. She notes she had a bit to eat in the morning and the pain worsened after eating. She notes that she wants to focus on being comfortable, does not wish to do any surgery or anything invasive. She would not want to get further blood work or workup. She and her daughter at bedside/HCP, they would want home hospice for her.  Prior admission from April 3-5, 2024 reviewed, came in for weakness, dark stool and anemia with hgb of 6, transfusion given and CT done, found to have mass in the mid ascending colon wall. Heme onc was consulted and patient was not agreeable to getting treatment.      Labs, vitals and imaging reviewed. Afebrile, HR 86, 123/63, 95% RA. Wbc noted 11, hgb 7.8, plt 497, Na 132. CT a/p - Long segment tumor probably adenocarcinoma involving the cecum extending to proximal ascending colon and into terminal ileum causing high-grade proximal bowel obstruction. Ascites. Peritoneum carcinomatosis. Metastatic adenopathy.   PE – elderly female, NAD, RRR, lungs grossly clear, abd softly distended, ttp mainly in lower quadrants, hypoactive bowel sounds, lower extremities with edema noted       A/P   #High grade bowel obstruction   #Ascites   #Peritoneal carcinomatosis   #Metastatic lymphadenopathy   #HTN   #Anemia   #Colonic mass (from cecum extending to prox ascending colon)     -clear diet for now, can advance if pain is improving   -pain control, tylenol for mild to mod, and IV morphine for severe pain prn   -can consider paracentesis if abdominal ascites worsens but unlikely patient will want it   -surgery consulted, no need for NGT given passing gas, and patient does not want to have any surgeries   -zofran prn   -continued on home medications as tolerating po  -palliative care consulted by ED; as patient is interested in home hospice   -GOC discussion with patient and her daughter/HCP, they do no want any surgical intervention for the mass/ obstruction, they wish to work only on pain control. No invasive measures, no blood draws, no blood transfusions, no HD, no feeding tube, no ngt, she wishes to be DNR/DNI. She is ok with iv fluids and abx; signed MOLST in chart  -oobtc/ambulate as tolerated   Patient seen and examined at bedside this morning. Notes still with mild abdominal pain which hurts with palpitation, but was able to tolerate liquids this morning without the pain increasing. Overall says she feels comfortably in bed currently.  AM lab, vitals reviewed as above. VSS, afebrile. Hgb 7.6. CT a/p - Long segment tumor probably adenocarcinoma involving the cecum extending to proximal ascending colon and into terminal ileum causing high-grade proximal bowel obstruction. Ascites. Peritoneum carcinomatosis. Metastatic adenopathy.   PE - elderly female, nontoxic appearing, NC/AT, RRR, +s1/s2, lungs grossly clear, abd diffusely ttp, extremites wwp, b/l edema noted.    A/P  85yo F with pmh of HTN, anemia, CAD s/p stent on ASA, known colonic mass (did not wish to purse additional tx), who presented with 1 week of worsening weakness, iso of watery stools, abdominal pain, found to have high grade bowel obstruction on imaging, admitted for pain control and she notes that she wants to focus on being comfortable, does not wish to do any surgery or anything invasive.     #High grade bowel obstruction   #Abdominal pain  #Ascites   #Peritoneal carcinomatosis   #Metastatic lymphadenopathy   #HTN   #Anemia   #Colonic mass (from cecum extending to prox ascending colon)     -clear diet for now, can advance if pain is improving   -pain control, tylenol for mild to mod, and IV morphine for severe pain prn   -can consider paracentesis if abdominal ascites worsens but unlikely patient will want it   -surgery consulted, no need for NGT given passing gas, and patient does not want to have any surgeries   -zofran prn   -continued on home medications as tolerating po  -palliative care consulted by ED; as patient is interested in home hospice   -GOC discussion with patient and her daughter/HCP, they do no want any surgical intervention for the mass/ obstruction, they wish to work only on pain control. No invasive measures, no blood draws, no blood transfusions, no HD, no feeding tube, no ngt, she wishes to be DNR/DNI. She is ok with iv fluids and abx; signed MOLST in chart  -oobtc/ambulate as tolerated   Patient seen and examined at bedside this morning. Notes still with mild abdominal pain which hurts with palpitation, but was able to tolerate liquids this morning without the pain increasing. Overall says she feels comfortably in bed currently.  AM lab, vitals reviewed as above. VSS, afebrile. Hgb 7.6. CT a/p - Long segment tumor probably adenocarcinoma involving the cecum extending to proximal ascending colon and into terminal ileum causing high-grade proximal bowel obstruction. Ascites. Peritoneum carcinomatosis. Metastatic adenopathy.   PE - elderly female, nontoxic appearing, NC/AT, RRR, +s1/s2, lungs grossly clear, abd diffusely ttp, extremites wwp, b/l edema noted.    A/P  85yo F with pmh of HTN, anemia, CAD s/p stent on ASA, known colonic mass (did not wish to purse additional tx), who presented with 1 week of worsening weakness, iso of watery stools, abdominal pain, found to have high grade bowel obstruction on imaging, admitted for pain control and she notes that she wants to focus on being comfortable, does not wish to do any surgery or anything invasive.     #High grade bowel obstruction   #Abdominal pain  #Ascites   #Peritoneal carcinomatosis   #Metastatic lymphadenopathy   #HTN   #Anemia   #Mild hyponatremia  #Colonic mass (from cecum extending to prox ascending colon)     -clear diet for now, can advance if pain is improving   -pain control, tylenol for mild to mod, and IV morphine for severe pain prn   -can consider paracentesis if abdominal ascites worsens but unlikely patient will want it   -surgery consulted, no need for NGT given passing gas, and patient does not want to have any surgeries   -zofran prn   -continued on home medications as tolerating po  -palliative care consulted by ED; as patient is interested in home hospice   -GOC discussion with patient and her daughter/HCP, they do no want any surgical intervention for the mass/ obstruction, they wish to work only on pain control. No invasive measures, no blood draws, no blood transfusions, no HD, no feeding tube, no ngt, she wishes to be DNR/DNI. She is ok with iv fluids and abx; signed MOLST in chart  -oobtc/ambulate as tolerated

## 2025-03-23 NOTE — CONSULT NOTE ADULT - TREATMENT GUIDELINE COMMENT
c/w current management for now, D/C unnecessary meds, d/c lipitor  c/w zofran, morphine PRN, protonix    change all meds to liquid if possible (morphine soln) when discharging home tomorrow.

## 2025-03-23 NOTE — PROGRESS NOTE ADULT - ASSESSMENT
83 y/o female with malignant SBO.  +bm/+flatus    - diet as tolerated  - pt not currently interested in any surgical intervention  - rec palliative consult and GOC discussion  - remainder of care per primary team   - discussed with dr. escobar

## 2025-03-23 NOTE — DISCHARGE NOTE PROVIDER - HOSPITAL COURSE
84F, from home, ambulates with cane, PMHx HTN, HLD, KG (receives IV iron transfusions with Dr Wes Kendall), CAD with stents, colonic mass (dx in 2024 via CT and PET imaging, has deferred colonoscopy in the past for further work up), ?dementia. Patient has been more forgetful in the past couple of years. Lives alone. Has been having lower abdominal pain for months. However, in the past week, has been having worsening abdominal pain, nausea, and poor appetite. Endorsing "red vomit" however drinks cranberry juice daily. Daughter is also skeptical that it is truly bloody vomit, given that her baseline hemoglobin is 7-8 (at outpatient office was 8.3 three weeks ago and last iron infusion was around that time).    CT A/P showed Long segment tumor probably adenocarcinoma involving the cecum extending to proximal ascending colon and into terminal ileum causing high-grade proximal bowel obstruction. Ascites. Peritoneum carcinomatosis. Metastatic adenopathy    Surgery followed, pt not currently interested in any surgical intervention. Palliative consulted????????    Pt is medically optimized for discharge, discussed with the attending       84F, from home, ambulates with cane, PMHx HTN, HLD, KG (receives IV iron transfusions with Dr Wes Kendall), CAD with stents, colonic mass (dx in 2024 via CT and PET imaging, has deferred colonoscopy in the past for further work up), ?dementia. Patient has been more forgetful in the past couple of years. Lives alone. Has been having lower abdominal pain for months. However, in the past week, has been having worsening abdominal pain, nausea, and poor appetite. Endorsing "red vomit" however drinks cranberry juice daily. Daughter is also skeptical that it is truly bloody vomit, given that her baseline hemoglobin is 7-8 (at outpatient office was 8.3 three weeks ago and last iron infusion was around that time).    CT A/P showed Long segment tumor probably adenocarcinoma involving the cecum extending to proximal ascending colon and into terminal ileum causing high-grade proximal bowel obstruction. Ascites. Peritoneum carcinomatosis. Metastatic adenopathy    Surgery followed, pt not currently interested in any surgical intervention. Palliative consulted for   further GOC discussion. Family does not want any invasive intervention and wish for long term placement  with hospice services   Pt is medically optimized for discharge

## 2025-03-23 NOTE — CONSULT NOTE ADULT - TIME BILLING
Stage 4 colon cancer with high grade partial SBO - for home hospice, symptoms improved but high risk of full obstruction and increase in symptoms, prognosis likely days to weeks. Rapid decline in the past few months and weeks.     spoke with primary attending and nursing team

## 2025-03-24 DIAGNOSIS — J96.01 ACUTE RESPIRATORY FAILURE WITH HYPOXIA: ICD-10-CM

## 2025-03-24 DIAGNOSIS — C18.9 MALIGNANT NEOPLASM OF COLON, UNSPECIFIED: ICD-10-CM

## 2025-03-24 PROCEDURE — 99233 SBSQ HOSP IP/OBS HIGH 50: CPT

## 2025-03-24 PROCEDURE — 71045 X-RAY EXAM CHEST 1 VIEW: CPT | Mod: 26

## 2025-03-24 RX ORDER — ONDANSETRON HCL/PF 4 MG/2 ML
4 VIAL (ML) INJECTION EVERY 6 HOURS
Refills: 0 | Status: DISCONTINUED | OUTPATIENT
Start: 2025-03-24 | End: 2025-03-28

## 2025-03-24 RX ORDER — IPRATROPIUM BROMIDE AND ALBUTEROL SULFATE .5; 2.5 MG/3ML; MG/3ML
3 SOLUTION RESPIRATORY (INHALATION) ONCE
Refills: 0 | Status: COMPLETED | OUTPATIENT
Start: 2025-03-24 | End: 2025-03-24

## 2025-03-24 RX ORDER — SENNA 187 MG
2 TABLET ORAL AT BEDTIME
Refills: 0 | Status: DISCONTINUED | OUTPATIENT
Start: 2025-03-24 | End: 2025-03-28

## 2025-03-24 RX ORDER — ONDANSETRON HCL/PF 4 MG/2 ML
4 VIAL (ML) INJECTION EVERY 4 HOURS
Refills: 0 | Status: DISCONTINUED | OUTPATIENT
Start: 2025-03-24 | End: 2025-03-24

## 2025-03-24 RX ORDER — IPRATROPIUM BROMIDE AND ALBUTEROL SULFATE .5; 2.5 MG/3ML; MG/3ML
3 SOLUTION RESPIRATORY (INHALATION) EVERY 6 HOURS
Refills: 0 | Status: DISCONTINUED | OUTPATIENT
Start: 2025-03-24 | End: 2025-03-24

## 2025-03-24 RX ADMIN — Medication 40 MILLIGRAM(S): at 22:19

## 2025-03-24 RX ADMIN — Medication 40 MILLIGRAM(S): at 12:31

## 2025-03-24 RX ADMIN — ISOSORBIDE MONONITRATE 30 MILLIGRAM(S): 60 TABLET, EXTENDED RELEASE ORAL at 12:31

## 2025-03-24 RX ADMIN — Medication 2 TABLET(S): at 22:19

## 2025-03-24 RX ADMIN — FUROSEMIDE 20 MILLIGRAM(S): 10 INJECTION INTRAMUSCULAR; INTRAVENOUS at 05:43

## 2025-03-24 RX ADMIN — IPRATROPIUM BROMIDE AND ALBUTEROL SULFATE 3 MILLILITER(S): .5; 2.5 SOLUTION RESPIRATORY (INHALATION) at 14:26

## 2025-03-24 RX ADMIN — METOPROLOL SUCCINATE 25 MILLIGRAM(S): 50 TABLET, EXTENDED RELEASE ORAL at 05:44

## 2025-03-24 NOTE — PROGRESS NOTE ADULT - PROBLEM SELECTOR PLAN 6
hx of CAD with stents.  home meds: aspirin 81 mg on hold due to anemia hx of CAD with stents.  dc home med aspirin - due to worsening anemia with dyspnea

## 2025-03-24 NOTE — PHARMACOTHERAPY INTERVENTION NOTE - COMMENTS
Patient identified by [facility] for Patients 64 y/o and Older Report. No intervention at this time.  (pt on comfort care).

## 2025-03-24 NOTE — DIETITIAN INITIAL EVALUATION ADULT - MALNUTRITION
Moderate Malnutrition in the context of chronic illness evidenced by inadequate protein energy intake, fat and muscle loss, edema in feet, related to Carcinoma in peritoneum, Intestinal obstruction, n/v PTA, advanced age, poor appetite PTA.

## 2025-03-24 NOTE — DIETITIAN INITIAL EVALUATION ADULT - ADD RECOMMEND
Mod Mal-Full liquid diet, Ensure Plus HP TID (Enlive no longer on formulary), heck weights, labs and intake.

## 2025-03-24 NOTE — DIETITIAN INITIAL EVALUATION ADULT - PERTINENT LABORATORY DATA
03-23    132[L]  |  100  |  18  ----------------------------<  95  3.6   |  26  |  0.74    Ca    8.7      23 Mar 2025 07:04  Phos  2.8     03-23  Mg     1.8     03-23    TPro  5.6[L]  /  Alb  2.0[L]  /  TBili  0.4  /  DBili  x   /  AST  25  /  ALT  19  /  AlkPhos  73  03-23

## 2025-03-24 NOTE — PROGRESS NOTE ADULT - PROBLEM SELECTOR PLAN 9
DVT - SCDs  - holding chemical ppx for now.    GI - protonix DVT - SCDs  - holding chemical ppx for now.    GI - protonix IV BID

## 2025-03-24 NOTE — PROGRESS NOTE ADULT - PROBLEM SELECTOR PLAN 4
c/w home med: metoprolol succinate 25 mg qd + lasix 20 mg qd   - BP controlled c/w home med: metoprolol succinate 25 mg qd   dc lasix   - BP controlled

## 2025-03-24 NOTE — PROGRESS NOTE ADULT - NS ATTEND AMEND GEN_ALL_CORE FT
Patient seen and examined at bedside this morning. Has mild diffuse abdominal pain which hurts with palpitation, and feeling a bit more distended today. She is still able to tolerate liquids this morning without the pain increasing. Overall says she feels comfortably in bed currently.  AM vitals reviewed as above. VSS, afebrile. No lab work to be done. CT a/p - Long segment tumor probably adenocarcinoma involving the cecum extending to proximal ascending colon and into terminal ileum causing high-grade proximal bowel obstruction. Ascites. Peritoneum carcinomatosis. Metastatic adenopathy.   PE - elderly female, nontoxic appearing, NC/AT, RRR, +s1/s2, lungs grossly clear, abd diffusely ttp, extremities wwp, b/l edema noted.    A/P  85yo F with pmh of HTN, anemia, CAD s/p stent on ASA, known colonic mass (did not wish to purse additional tx), who presented with 1 week of worsening weakness, iso of watery stools, abdominal pain, found to have high grade bowel obstruction on imaging, admitted for pain control and she notes that she wants to focus on being comfortable, does not wish to do any surgery or anything invasive.     #High grade bowel obstruction   #Abdominal pain  #Ascites   #Peritoneal carcinomatosis   #Metastatic lymphadenopathy   #HTN   #Anemia   #Mild hyponatremia  #Colonic mass (from cecum extending to prox ascending colon)     -liquid diet for now, can advance if pain is improving   -pain control, tylenol for mild to mod, and IV morphine for severe pain prn   -can consider paracentesis if abdominal ascites worsens but unlikely patient will want it   -surgery consulted, no need for NGT given passing gas, and patient does not want to have any surgeries   -zofran prn   -continued on home medications as tolerating po  -palliative care folllowing; patient is interested in home hospice   -GOC discussion with patient and her daughter/HCP, they do no want any surgical intervention for the mass/ obstruction, they wish to work only on pain control. No invasive measures, no blood draws, no blood transfusions, no HD, no feeding tube, no ngt, she wishes to be DNR/DNI. She is ok with iv fluids and abx; signed MOLST in chart  -pall care SW following, family to make decision about whether home or facility would be best for her  -oobtc/ambulate as tolerated

## 2025-03-24 NOTE — DIETITIAN INITIAL EVALUATION ADULT - NS FNS DIET ORDER
Diet, Full Liquid:   Fiber/Residue Restricted  Supplement Feeding Modality:  Oral  Ensure Enlive Cans or Servings Per Day:  1       Frequency:  Three Times a day (03-24-25 @ 10:31)

## 2025-03-24 NOTE — PROGRESS NOTE ADULT - PROBLEM SELECTOR PLAN 1
hx of Emphysema  Daughter reports baseline oxygen sat 90-92%  with Anemia 2/2 metastatic colon cancer  c/w supplemental oxygen 2L nasal cannula  c/w albuterol nebulizer q6h x1 day  pt will need home oxygen setup   per daughter, does not want blood transfusion  f/u CXR hx of Emphysema  Daughter reports baseline oxygen sat 90-92%  with Anemia 2/2 metastatic colon cancer  c/w supplemental oxygen 2L nasal cannulafor comfort  c/w albuterol nebulizer q6h x1 day  pt will need home oxygen setup   per daughter, does not want blood transfusion  f/u CXR

## 2025-03-24 NOTE — PROGRESS NOTE ADULT - ASSESSMENT
85 y/o F, from home, ambulates with cane, PMH HTN, HLD, KG (receives IV iron transfusions with Dr Wes Kendall), CAD with stents, colonic mass (dx in 2024 via CT and PET imaging, has deferred colonoscopy in the past for further work up), ?dementia. Patient appears poor historian. Collateral obtained from daughter at bedside. Presenting with abdominal pain, n/v x1 week. Found to have CTAP showing Long segment tumor probably adenocarcinoma involving the cecum extending to proximal ascending colon and into terminal ileum causing high-grade proximal bowel obstruction. Ascites. Peritoneum carcinomatosis. Metastatic adenopathy. Admitted for malignant bowel obstruction and further GOC discussion. Family does not want any invasive intervention. Family is still considering home hospice. SW following.        85 y/o F, from home, ambulates with cane, PMH HTN, HLD, KG (receives IV iron transfusions with Dr Wes Kendall), CAD with stents, colonic mass (dx in 2024 via CT and PET imaging, has deferred colonoscopy in the past for further work up), ?dementia. Patient appears poor historian. Collateral obtained from daughter at bedside. Presenting with abdominal pain, n/v x1 week. Found to have CTAP showing Long segment tumor probably adenocarcinoma involving the cecum extending to proximal ascending colon and into terminal ileum causing high-grade proximal bowel obstruction. Ascites. Peritoneum carcinomatosis. Metastatic adenopathy. Admitted for malignant bowel obstruction and further GOC discussion. Family does not want any invasive intervention. pending inpatient Hospice possibly at [facility]. SW Following.

## 2025-03-24 NOTE — DIETITIAN INITIAL EVALUATION ADULT - PHYSCIAL ASSESSMENT
General: A&Ox4, OOB standby, continent of urine and stool. Skin warm, dry with increased moisture in intertriginous folds,  scattered areas of hyperpigmentation and hypopigmentation, scattered areas of ecchymosis on bilateral upper extremities.     Vascular: Bilateral lower extremities with dry, flaky skin. Thickened-yellow hyperpigmented overgrown toenails. No temperature changes noted. Capillary refill <3 seconds. Palpable DP/PT pulses. Patient complaining of neuropathy to R foot.    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 Calllous with small opening in center exposing 100% pink dermis. Scant serosanguinous drainage, no odor. No induration, no erythema, no crepitus, no edema, no temperature changes, no overt signs of infection noted.     R plantar foot: Neuropathic wound, followed by podiatry outpatient, measuring 2cmx1.5cmx0.5cm with undermining from 5 o'clock to 7 o'clock extending 0.3cm. Base of wound presenting with 5% viable tendon and 95% pink moist agranular/dermis tissue. Scant serosanguinous drainage with no odor. Periwound with calloused wound edges circumferentially. Entire plantar aspect of foot boggy with palpation. No induration, no erythema, no crepitus, no fluctuance, no edema, no temperature changes, no overt signs of infection noted. 
underweight

## 2025-03-24 NOTE — DIETITIAN INITIAL EVALUATION ADULT - OTHER INFO
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 Family indicated chronic constipation.

## 2025-03-24 NOTE — DIETITIAN INITIAL EVALUATION ADULT - ORAL INTAKE PTA/DIET HISTORY
Patient was seen bedside with daughter and son in law present. Patient was not able to provide much information due to dementia. Daughter was able to provide information. Patient has lost a little weight over the past few months, she also is 5'3" not 5'4" due to age related loss. She eats a regular diet, 3 meals daily plus snacks. She takes MVI, Vit D and Ca, Cranberry pills and fish oil. No food preferences endorsed. No food allergies indicated. Patient said she is hungry and would like to eat food as soon as possible. N/V and poor appetite prior to admission indicated by family.

## 2025-03-24 NOTE — DIETITIAN INITIAL EVALUATION ADULT - PERTINENT MEDS FT
MEDICATIONS  (STANDING):  donepezil 5 milliGRAM(s) Oral at bedtime  furosemide    Tablet 20 milliGRAM(s) Oral daily  isosorbide   mononitrate ER Tablet (IMDUR) 30 milliGRAM(s) Oral daily  metoprolol succinate ER 25 milliGRAM(s) Oral daily  pantoprazole  Injectable 40 milliGRAM(s) IV Push daily    MEDICATIONS  (PRN):  acetaminophen   Oral Liquid .. 650 milliGRAM(s) Oral every 6 hours PRN Temp greater or equal to 38C (100.4F), Mild Pain (1 - 3)  morphine  - Injectable 2 milliGRAM(s) IV Push every 6 hours PRN Severe Pain (7 - 10)

## 2025-03-24 NOTE — PROGRESS NOTE ADULT - PROBLEM SELECTOR PLAN 3
- Patient and family not currently interested in any surgical intervention. Goal is comfort-focused care (see discussion above). Amendable to abx and IVF. Would like to defer blood transfusion consent for now and consider at later time if necessary. Does not want NG tube. Family would appreciate home hospice as option.  - Tolerating clears, will advance diet to full liquid, fiber restriction  - Surgery Team followed - Patient and family not currently interested in any surgical intervention. Goal is comfort-focused care (see discussion above). Amendable to abx and IVF. Would like to defer blood transfusion consent for now and consider at later time if necessary. Does not want NG tube. Family would appreciate home hospice as option.  - add senna 2 tabs qhs  - avoid miralax  - Tolerating clears, will advance diet to full liquid, fiber restriction  - Surgery Team followed

## 2025-03-24 NOTE — PROGRESS NOTE ADULT - PROBLEM SELECTOR PLAN 7
hx of KG  receives IV iron transfusions with Dr Wes Kendall). Last transfusion 3 wks ago.  As per daughter, Hb 3 weeks ago was 8.3.   - appears baseline Hb ~8.  - consider Heme Onc consult in the AM to resume IV iron.   - maintain active T+S.   - Patient and family like to defer blood transfusion consent for now and consider at later time if necessary. hx of KG  receives IV iron transfusions with Dr Wes Kendall). Last transfusion 3 wks ago.  As per daughter, Hb 3 weeks ago was 8.3.   - hold off on IV iron at this time

## 2025-03-24 NOTE — DIETITIAN NUTRITION RISK NOTIFICATION - ADDITIONAL COMMENTS/DIETITIAN RECOMMENDATIONS
Continue current diet, Ensure Plus HP TID as Enlive no longer on formulary provides 350 Raphael and 20 gm protein each serving. Check weights, labs and intake allow family to provide appropriate requested foods.

## 2025-03-24 NOTE — PROGRESS NOTE ADULT - PROBLEM SELECTOR PLAN 2
Presenting with abdominal pain, n/v x1 week.   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 Ascites. Peritoneum carcinomatosis. Metastatic adenopathy.  - pain control   - PPI.   - zofran PRN nausea.  - Palliative following - referral for home hospice but family remains undecided  - SW following Presenting with abdominal pain, n/v x1 week.   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 Ascites. Peritoneum carcinomatosis. Metastatic adenopathy.  - pain control   - PPI.   - zofran PRN nausea.  - Palliative following - referral for Hospice possibly at [facility]

## 2025-03-25 DIAGNOSIS — C78.6 SECONDARY MALIGNANT NEOPLASM OF RETROPERITONEUM AND PERITONEUM: ICD-10-CM

## 2025-03-25 PROCEDURE — 99232 SBSQ HOSP IP/OBS MODERATE 35: CPT

## 2025-03-25 RX ADMIN — Medication 40 MILLIGRAM(S): at 21:23

## 2025-03-25 RX ADMIN — Medication 2 MILLIGRAM(S): at 21:53

## 2025-03-25 RX ADMIN — ISOSORBIDE MONONITRATE 30 MILLIGRAM(S): 60 TABLET, EXTENDED RELEASE ORAL at 11:02

## 2025-03-25 RX ADMIN — Medication 40 MILLIGRAM(S): at 09:05

## 2025-03-25 RX ADMIN — Medication 2 MILLIGRAM(S): at 21:23

## 2025-03-25 RX ADMIN — METOPROLOL SUCCINATE 25 MILLIGRAM(S): 50 TABLET, EXTENDED RELEASE ORAL at 06:26

## 2025-03-25 RX ADMIN — Medication 2 TABLET(S): at 21:23

## 2025-03-25 NOTE — PROGRESS NOTE ADULT - PROBLEM SELECTOR PLAN 1
hx of Emphysema  Daughter reports baseline oxygen sat 90-92%  with Anemia 2/2 metastatic colon cancer  c/w supplemental oxygen 2L nasal cannula for comfort  c/w albuterol nebulizer q6h x1 day  pt will need home oxygen setup   per daughter, does not want blood transfusion  f/u CXR

## 2025-03-25 NOTE — PROGRESS NOTE ADULT - ASSESSMENT
85 y/o F, from home, ambulates with cane, PMH HTN, HLD, KG (receives IV iron transfusions with Dr Wes Kendall), CAD with stents, colonic mass (dx in 2024 via CT and PET imaging, has deferred colonoscopy in the past for further work up), ?dementia. Patient appears poor historian. Collateral obtained from daughter at bedside. Presenting with abdominal pain, n/v x1 week. Found to have CTAP showing Long segment tumor probably adenocarcinoma involving the cecum extending to proximal ascending colon and into terminal ileum causing high-grade proximal bowel obstruction. Ascites. Peritoneum carcinomatosis. Metastatic adenopathy. Admitted for malignant bowel obstruction and further GOC discussion. Family does not want any invasive intervention. pending inpatient Hospice possibly at [facility]. SW Following.

## 2025-03-25 NOTE — PROGRESS NOTE ADULT - PROBLEM SELECTOR PLAN 3
- Patient and family not currently interested in any surgical intervention. Goal is comfort-focused care (see discussion above). Amendable to abx and IVF. Would like to defer blood transfusion consent for now and consider at later time if necessary. Does not want NG tube. Family would appreciate home hospice as option.  - add senna 2 tabs qhs  - avoid miralax  - Tolerating clears, will advance diet to full liquid, fiber restriction  3/25 - Surgery no longer following for partial SBO as patient & family does not want surgical intvn         - Tolerating a full liquid diet

## 2025-03-25 NOTE — PROGRESS NOTE ADULT - PROBLEM SELECTOR PLAN 7
hx of KG  receives IV iron transfusions with Dr Wes Kendall). Last transfusion 3 wks ago.  As per daughter, Hb 3 weeks ago was 8.3.   - hold off on IV iron at this time

## 2025-03-25 NOTE — PROGRESS NOTE ADULT - NS ATTEND AMEND GEN_ALL_CORE FT
Patient is alert.  Today is her birthday.  She denies acute pain.     85yo F with pmh of HTN, anemia, CAD s/p stent on ASA, known colonic mass (did not wish to purse additional tx), who presented with 1 week of worsening weakness, iso of watery stools, abdominal pain, found to have high grade bowel obstruction on imaging, admitted for pain control and she notes that she wants to focus on being comfortable, does not wish to do any surgery or anything invasive.     #High grade bowel obstruction   #Abdominal pain  #Ascites   #Peritoneal carcinomatosis   #Metastatic lymphadenopathy   #HTN   #Anemia   #Mild hyponatremia  #Colonic mass (from cecum extending to prox ascending colon)     GOC reviewed, patient wishes comfort measures only.  Will likely opt for LTC with Hospice

## 2025-03-25 NOTE — PROGRESS NOTE ADULT - PROBLEM SELECTOR PLAN 2
Presenting with abdominal pain, n/v x1 week.   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 Ascites. Peritoneum carcinomatosis. Metastatic adenopathy.  - pain control   - PPI.   - zofran PRN nausea.  - Palliative following - referral for Hospice possibly at [facility]

## 2025-03-26 DIAGNOSIS — R33.9 RETENTION OF URINE, UNSPECIFIED: ICD-10-CM

## 2025-03-26 PROCEDURE — 99232 SBSQ HOSP IP/OBS MODERATE 35: CPT

## 2025-03-26 RX ADMIN — Medication 40 MILLIGRAM(S): at 09:47

## 2025-03-26 RX ADMIN — Medication 40 MILLIGRAM(S): at 22:22

## 2025-03-26 RX ADMIN — ISOSORBIDE MONONITRATE 30 MILLIGRAM(S): 60 TABLET, EXTENDED RELEASE ORAL at 18:23

## 2025-03-26 RX ADMIN — Medication 2 TABLET(S): at 22:23

## 2025-03-26 RX ADMIN — METOPROLOL SUCCINATE 25 MILLIGRAM(S): 50 TABLET, EXTENDED RELEASE ORAL at 05:18

## 2025-03-26 NOTE — PROGRESS NOTE ADULT - PROBLEM SELECTOR PLAN 2
bladder scan 500 ml in the bladder   sp straigth cath   repeat bladder scan   insert daugherty if in retention

## 2025-03-26 NOTE — PROGRESS NOTE ADULT - PROBLEM SELECTOR PLAN 1
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 Ascites. Peritoneum carcinomatosis. Metastatic adenopathy.  - pain control   -comfort measures   - Palliative following - referral for Hospice possibly at [facility]

## 2025-03-26 NOTE — PROGRESS NOTE ADULT - NS ATTEND AMEND GEN_ALL_CORE FT
Patient is alert.   She denies acute pain. She has had urinary retention, but is not symptomatic.     85yo F with pmh of HTN, anemia, CAD s/p stent on ASA, known colonic mass (did not wish to purse additional tx), who presented with 1 week of worsening weakness, iso of watery stools, abdominal pain, found to have high grade bowel obstruction on imaging, admitted for pain control and she notes that she wants to focus on being comfortable, does not wish to do any surgery or anything invasive.     #High grade bowel obstruction   #Abdominal pain  #Ascites   #Peritoneal carcinomatosis   #Metastatic lymphadenopathy   #HTN   #Anemia   #Mild hyponatremia, possibly secondary to urinary retention  #urinary retention, patient is asymptomatic.  Will repeat bladder scan, straight cath, as needed.  No Henson, at present.   #Colonic mass (from cecum extending to prox ascending colon)     GOC reviewed, patient wishes comfort measures only.  Will likely opt for LTC with Hospice

## 2025-03-26 NOTE — PROGRESS NOTE ADULT - ASSESSMENT
85 y/o F, from home, ambulates with cane, PMH HTN, HLD, KG (receives IV iron transfusions with Dr Wes Kendall), CAD with stents, colonic mass (dx in 2024 via CT and PET imaging, has deferred colonoscopy in the past for further work up), ?dementia, presented with abdominal pain, n/v x1 week. CTAP shows long segment tumor probably adenocarcinoma involving the cecum extending to proximal ascending colon and into terminal ileum causing high-grade proximal bowel obstruction. Ascites. Peritoneum carcinomatosis. Metastatic adenopathy. Admitted for malignant bowel obstruction and further GOC discussion. Family does not want any invasive intervention. Pending  long term placement  with hospice services   Pt noted in urinary retention, Bladder scan showed 500 ml urine, sp straight cath

## 2025-03-26 NOTE — CHART NOTE - NSCHARTNOTEFT_GEN_A_CORE
EVENT: Nurse reporting no voiding overnight via prima fit or incontinence    BRIEF HPI: 85 y/o F, from home. PMH HTN, HDL, KG, CAD with stents, colonic mass (dx in 2024 via CT and PET imaging, has deferred colonoscopy in the past for further work up), ?dementia. Patient appears poor historian.  Presenting with abdominal pain, n/v x1 week. Found to have CTAP showing Long segment tumor probably adenocarcinoma involving the cecum extending to proximal ascending colon and into terminal ileum causing high-grade proximal bowel obstruction. Ascites. Peritoneum carcinomatosis. Metastatic adenopathy. Admitted for malignant bowel obstruction and further. GOC discussion. Family does not want any invasive intervention. pending inpatient Hospice possibly at [place]. SW Following.     Vital Signs Last 24 Hrs  T(C): 36.5 (26 Mar 2025 05:10), Max: 37 (25 Mar 2025 20:40)  T(F): 97.7 (26 Mar 2025 05:10), Max: 98.6 (25 Mar 2025 20:40)  HR: 76 (26 Mar 2025 05:10) (76 - 123)  BP: 119/68 (26 Mar 2025 05:10) (119/68 - 148/78)  BP(mean): 96 (25 Mar 2025 20:40) (96 - 96)  RR: 18 (26 Mar 2025 05:10) (17 - 18)  SpO2: 94% (26 Mar 2025 05:10) (86% - 94%)    Parameters below as of 26 Mar 2025 05:10  Patient On (Oxygen Delivery Method): nasal cannula  O2 Flow (L/min): 6    FOCUSSED PE  GEN: Elderly female in bed  : Mild bladder distension  RESP: Even, unlabored    FOLLOW UP: Bladder scan noon today

## 2025-03-27 PROCEDURE — 99232 SBSQ HOSP IP/OBS MODERATE 35: CPT

## 2025-03-27 RX ADMIN — ISOSORBIDE MONONITRATE 30 MILLIGRAM(S): 60 TABLET, EXTENDED RELEASE ORAL at 12:49

## 2025-03-27 RX ADMIN — Medication 40 MILLIGRAM(S): at 12:50

## 2025-03-27 RX ADMIN — Medication 40 MILLIGRAM(S): at 21:18

## 2025-03-27 RX ADMIN — METOPROLOL SUCCINATE 25 MILLIGRAM(S): 50 TABLET, EXTENDED RELEASE ORAL at 05:42

## 2025-03-27 RX ADMIN — Medication 2 TABLET(S): at 21:18

## 2025-03-27 NOTE — PROGRESS NOTE ADULT - PROBLEM SELECTOR PLAN 5
dc home meds: atorvastatin 20 mg qhs

## 2025-03-27 NOTE — PROGRESS NOTE ADULT - NS ATTEND AMEND GEN_ALL_CORE FT
Patient appears much more comfortable today.   She denies acute pain. She has had urinary retention, but is not symptomatic. She has put out 500ml of urine since AM.   Patient's son is at the bedside.   83yo F with pmh of HTN, anemia, CAD s/p stent on ASA, known colonic mass (did not wish to purse additional tx), who presented with 1 week of worsening weakness, iso of watery stools, abdominal pain, found to have high grade bowel obstruction on imaging, admitted for pain control and she notes that she wants to focus on being comfortable, does not wish to do any surgery or anything invasive.     #High grade bowel obstruction   #Abdominal pain  #Ascites   #Peritoneal carcinomatosis   #Metastatic lymphadenopathy   #HTN   #Anemia   #Mild hyponatremia, possibly secondary to urinary retention  #urinary retention, patient is asymptomatic.  Will repeat bladder scan, straight cath, as needed.  No Henson, at present.   #Colonic mass (from cecum extending to prox ascending colon)     GOC reviewed, patient wishes comfort measures only.  Will likely opt for LTC with Hospice  Patient expresses that she is aware of what is happening and is not afraid. Patient appears much more comfortable today.   She denies acute pain. She has had urinary retention, but is not symptomatic. She has put out 500ml of urine since AM.   Patient's son is at the bedside.   83yo F with pmh of HTN, anemia, CAD s/p stent on ASA, known colonic mass (did not wish to purse additional tx), who presented with 1 week of worsening weakness, iso of watery stools, abdominal pain, found to have high grade bowel obstruction on imaging, admitted for pain control and she notes that she wants to focus on being comfortable, does not wish to do any surgery or anything invasive.     #High grade bowel obstruction   #Abdominal pain  #Ascites   #Peritoneal carcinomatosis   #Metastatic lymphadenopathy   #HTN   #Anemia   #Mild hyponatremia, possibly secondary to urinary retention  #urinary retention, patient is asymptomatic.  Will repeat bladder scan, straight cath, as needed.  No Henson, at present.   #Colonic mass (from cecum extending to prox ascending colon)     GOC reviewed, patient wishes comfort measures only.  Will likely opt for LTC with Hospice  Patient expresses that she is aware of what is happening and is not afraid.  Plan discussed with Palliative SURAJ Bai.

## 2025-03-27 NOTE — PROGRESS NOTE ADULT - PROBLEM SELECTOR PLAN 1
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 Ascites. Peritoneum carcinomatosis. Metastatic adenopathy.  - pain control   -comfort measures   - Palliative following - referral for Hospice possibly at [facility]

## 2025-03-27 NOTE — PROGRESS NOTE ADULT - ASSESSMENT
85 y/o F, from home, ambulates with cane, PMH HTN, HLD, KG (receives IV iron transfusions with Dr Wes Kendall), CAD with stents, colonic mass (dx in 2024 via CT and PET imaging, has deferred colonoscopy in the past for further work up), ?dementia, presented with abdominal pain, n/v x1 week. CTAP shows long segment tumor probably adenocarcinoma involving the cecum extending to proximal ascending colon and into terminal ileum causing high-grade proximal bowel obstruction. Ascites. Peritoneum carcinomatosis. Metastatic adenopathy. Admitted for malignant bowel obstruction and further GOC discussion. Family does not want any invasive intervention. Pending  long term placement  with hospice services

## 2025-03-27 NOTE — PROGRESS NOTE ADULT - PROBLEM/PLAN-6
DISPLAY PLAN FREE TEXT
DISPLAY PLAN FREE TEXT
The patient is a 67y Male complaining of "feeling lousy" since his ICD fired on 2/28.
DISPLAY PLAN FREE TEXT
DISPLAY PLAN FREE TEXT

## 2025-03-27 NOTE — PROGRESS NOTE ADULT - PROBLEM SELECTOR PROBLEM 1
Acute hypoxic respiratory failure
Acute hypoxic respiratory failure
Carcinoma of colon metastatic to multiple sites
Carcinoma of colon metastatic to multiple sites

## 2025-03-27 NOTE — PROGRESS NOTE ADULT - PROBLEM SELECTOR PROBLEM 7
KG (iron deficiency anemia)

## 2025-03-27 NOTE — PROGRESS NOTE ADULT - NUTRITIONAL ASSESSMENT
This patient has been assessed with a concern for Malnutrition and has been determined to have a diagnosis/diagnoses of Moderate protein-calorie malnutrition.    This patient is being managed with:   Diet Full Liquid-  Fiber/Residue Restricted  Supplement Feeding Modality:  Oral  Ensure Enlive Cans or Servings Per Day:  1       Frequency:  Three Times a day  Entered: Mar 24 2025 10:31AM  

## 2025-03-27 NOTE — PROGRESS NOTE ADULT - PROBLEM SELECTOR PROBLEM 2
Urinary retention
Urinary retention
Carcinoma of colon metastatic to multiple sites
Carcinoma of colon metastatic to multiple sites

## 2025-03-27 NOTE — PROGRESS NOTE ADULT - SUBJECTIVE AND OBJECTIVE BOX
INTERVAL HPI/OVERNIGHT EVENTS:  Pt seen/examined at bedside. No acute complaints. Tolerating diet. Admits to passing gas this morning and having bowel movement last night. Continues to endorse that she is at an elderly age and would like conservative management given her situation.   Denies any overt abd pain, however when palpating the RLQ she winced. When asked about this pain and whether it was a new pain, she notes that she has had this pain in that region for a while.     Vital Signs Last 24 Hrs  T(C): 36.7 (23 Mar 2025 04:57), Max: 36.8 (22 Mar 2025 20:10)  T(F): 98 (23 Mar 2025 04:57), Max: 98.2 (22 Mar 2025 20:10)  HR: 97 (23 Mar 2025 04:57) (74 - 97)  BP: 118/61 (23 Mar 2025 04:57) (103/62 - 123/63)  BP(mean): --  RR: 18 (23 Mar 2025 04:57) (16 - 18)  SpO2: 91% (23 Mar 2025 04:57) (91% - 95%)    Parameters below as of 23 Mar 2025 04:57  Patient On (Oxygen Delivery Method): room air      I&O's Detail      Medications:  pantoprazole  Injectable 40 milliGRAM(s) IV Push daily      Physical Exam:  General: AAOx3, No acute distress  HEENT: NC/AT, trachea midline  Respiratory: Nonlabored breathing, equal chest rise b/l   Abdomen: soft,  nondistended, tender at the RLQ, no rebound tenderness, no guarding, no palpable masses      Drains/Tubes:       Labs:                        7.6    12.33 )-----------( 433      ( 23 Mar 2025 07:04 )             25.0     03-23    132[L]  |  100  |  18  ----------------------------<  95  3.6   |  26  |  0.74    Ca    8.7      23 Mar 2025 07:04  Phos  2.8     03-23  Mg     1.8     03-23    TPro  5.6[L]  /  Alb  2.0[L]  /  TBili  0.4  /  DBili  x   /  AST  25  /  ALT  19  /  AlkPhos  73  03-23    PT/INR - ( 22 Mar 2025 14:50 )   PT: 16.4 sec;   INR: 1.41 ratio         PTT - ( 22 Mar 2025 14:50 )  PTT:28.9 sec    RADIOLOGY & ADDITIONAL STUDIES:  
Vital Signs Last 24 Hrs  T(C): 36.8 (23 Mar 2025 13:52), Max: 36.8 (22 Mar 2025 20:10)  T(F): 98.2 (23 Mar 2025 13:52), Max: 98.2 (22 Mar 2025 20:10)  HR: 89 (23 Mar 2025 13:52) (74 - 97)  BP: 120/65 (23 Mar 2025 13:52) (103/62 - 120/65)  BP(mean): 83 (23 Mar 2025 13:52) (83 - 83)  RR: 18 (23 Mar 2025 13:52) (18 - 18)  SpO2: 96% (23 Mar 2025 13:52) (91% - 96%)    Parameters below as of 23 Mar 2025 13:52  Patient On (Oxygen Delivery Method): room air                          7.6    12.33 )-----------( 433      ( 23 Mar 2025 07:04 )             25.0     03-23    132[L]  |  100  |  18  ----------------------------<  95  3.6   |  26  |  0.74    Ca    8.7      23 Mar 2025 07:04  Phos  2.8     03-23  Mg     1.8     03-23    TPro  5.6[L]  /  Alb  2.0[L]  /  TBili  0.4  /  DBili  x   /  AST  25  /  ALT  19  /  AlkPhos  73  03-23      
Patient is a 84y old  Female who presents with a chief complaint of Intestinal obstruction     (24 Mar 2025 10:43)    OVERNIGHT EVENTS: no acute changes.     Pt is aox3, but lacks insight into medical care, with dyspnea on exertion - 2L oxygen via nasal cannula, tolerating clears this morning, remains bedridden due to abdominal discomfort and generalized weakness.     REVIEW OF SYSTEMS:  CONSTITUTIONAL: No fever, chills  ENMT:  No difficulty hearing, no change in vision  NECK: No pain or stiffness  RESPIRATORY: No cough, SOB  CARDIOVASCULAR: No chest pain, palpitations  GASTROINTESTINAL: +abdominal pain. +nausea, vomiting, diarrhea  GENITOURINARY: No dysuria  NEUROLOGICAL: No HA  SKIN: No itching, burning, rashes, or lesions   LYMPH NODES: No enlarged glands  ENDOCRINE: No heat or cold intolerance; No hair loss  MUSCULOSKELETAL: No joint pain or swelling; No muscle, back, or extremity pain  PSYCHIATRIC: No depression, anxiety  HEME/LYMPH: No easy bruising, or bleeding gums    T(C): 36.7 (03-24-25 @ 12:30), Max: 37.3 (03-24-25 @ 05:39)  HR: 88 (03-24-25 @ 12:30) (88 - 100)  BP: 136/69 (03-24-25 @ 12:30) (120/65 - 136/69)  RR: 18 (03-24-25 @ 12:30) (18 - 18)  SpO2: 94% (03-24-25 @ 12:30) (92% - 96%)  Wt(kg): --Vital Signs Last 24 Hrs  T(C): 36.7 (24 Mar 2025 12:30), Max: 37.3 (24 Mar 2025 05:39)  T(F): 98 (24 Mar 2025 12:30), Max: 99.1 (24 Mar 2025 05:39)  HR: 88 (24 Mar 2025 12:30) (88 - 100)  BP: 136/69 (24 Mar 2025 12:30) (120/65 - 136/69)  BP(mean): 91 (24 Mar 2025 12:30) (83 - 91)  RR: 18 (24 Mar 2025 12:30) (18 - 18)  SpO2: 94% (24 Mar 2025 12:30) (92% - 96%)    Parameters below as of 24 Mar 2025 12:30  Patient On (Oxygen Delivery Method): nasal cannula  O2 Flow (L/min): 3      MEDICATIONS  (STANDING):  donepezil 5 milliGRAM(s) Oral at bedtime  furosemide    Tablet 20 milliGRAM(s) Oral daily  isosorbide   mononitrate ER Tablet (IMDUR) 30 milliGRAM(s) Oral daily  metoprolol succinate ER 25 milliGRAM(s) Oral daily  pantoprazole  Injectable 40 milliGRAM(s) IV Push daily    MEDICATIONS  (PRN):  acetaminophen   Oral Liquid .. 650 milliGRAM(s) Oral every 6 hours PRN Temp greater or equal to 38C (100.4F), Mild Pain (1 - 3)  morphine  - Injectable 2 milliGRAM(s) IV Push every 6 hours PRN Severe Pain (7 - 10)      PHYSICAL EXAM:  GENERAL: NAD  EYES: clear conjunctiva  ENMT: Moist mucous membranes  NECK: Supple, No JVD, Normal thyroid  CHEST/LUNG: +nasal cannula. Clear to auscultation bilaterally; No rales, rhonchi, wheezing, or rubs  HEART: S1, S2, Regular rate and rhythm  ABDOMEN: Soft, +tenderness all over, +distended; Bowel sounds present  NEURO: Alert & Oriented X3  EXTREMITIES: No LE edema, no calf tenderness  LYMPH: No lymphadenopathy noted  SKIN: No rashes or lesions    Consultant(s) Notes Reviewed:  [x ] YES  [ ] NO  Care Discussed with Consultants/Other Providers [ x] YES  [ ] NO    LABS:                        7.6    12.33 )-----------( 433      ( 23 Mar 2025 07:04 )             25.0     03-23    132[L]  |  100  |  18  ----------------------------<  95  3.6   |  26  |  0.74    Ca    8.7      23 Mar 2025 07:04  Phos  2.8     03-23  Mg     1.8     03-23    TPro  5.6[L]  /  Alb  2.0[L]  /  TBili  0.4  /  DBili  x   /  AST  25  /  ALT  19  /  AlkPhos  73  03-23    PT/INR - ( 22 Mar 2025 14:50 )   PT: 16.4 sec;   INR: 1.41 ratio         PTT - ( 22 Mar 2025 14:50 )  PTT:28.9 sec  CAPILLARY BLOOD GLUCOSE            Urinalysis Basic - ( 23 Mar 2025 07:04 )    Color: x / Appearance: x / SG: x / pH: x  Gluc: 95 mg/dL / Ketone: x  / Bili: x / Urobili: x   Blood: x / Protein: x / Nitrite: x   Leuk Esterase: x / RBC: x / WBC x   Sq Epi: x / Non Sq Epi: x / Bacteria: x        RADIOLOGY & ADDITIONAL TESTS:  < from: CT Abdomen and Pelvis w/ IV Cont (03.22.25 @ 15:41) >    ACC: [id number] EXAM:  CT ABDOMEN AND PELVIS IC   ORDERED BY: CHARMAINE SPANGLER     PROCEDURE DATE:  03/22/2025          INTERPRETATION:  CLINICAL INFORMATION: 84-year-old female. Abdominal   pain, distention.    COMPARISON: CT abdomen pelvis 4/4/2024    CONTRAST/COMPLICATIONS:  IV Contrast: Omnipaque 350  90 cc administered   10 cc discarded  Oral Contrast: NONE  .    PROCEDURE:  CT of the Abdomen and Pelvis was performed.  Sagittal and coronal reformats were performed.    FINDINGS:  LOWER CHEST: Cardiomegaly. Subtle aortic valve calcification right   coronary artery calcification versus stent. Moderate hiatal hernia.    LIVER: The liver is enlarged. No focal liver lesion identified.  BILE DUCTS: Normal caliber.  GALLBLADDER: Within normal limits.  SPLEEN: Within normal limits.  PANCREAS: Within normal limits.  ADRENALS: Within normal limits.  KIDNEYS/URETERS: Normal size. No hydronephrosis. There appear to be   subtle bilateral renal cortical nephrographic defects    BLADDER: Within normal limits.  REPRODUCTIVE ORGANS: Status post hysterectomy. Ovaries not identified in   the side likely status post oophorectomy.    BOWEL: Evidence of bowel obstruction due to an approximately 10 cm tumor   mass involving the cecum and ascending colon. Increased as compared to   prior study 4/4/2024. The cecal pole is distended with liquefied feces.   The tumor mass is probably extending into the terminal ileum. Diameter of   the inferior cecal pole measures 6.4 cm. The small bowel is markedly   distended there is wall thickening of the terminal and distal ileum. No   bowel pneumatosis. No mesenteric venous or portal venous air. Large   bilocular periampullary-second part of duodenum diverticulum.  PERITONEUM/RETROPERITONEUM: Moderate ascites. Infiltration of the omental   fat. 1.0 cm soft tissue omental nodule on the right anteriorly in the   upper pelvis only metastatic. Probable peritoneal carcinomatosis. 1.0 cm   soft tissue nodule in the root of the mesentery.  VESSELS: Within normallimits.  LYMPH NODES: Enlarged right medial pericolic and  mesenteric nodes likely   metastatic.  ABDOMINAL WALL: Within normal limits.  BONES: Chondrocalcinosis of the symphysis pubis and lumbar spine.   Degenerative disc disease L1-L2. Slight retrolisthesis of L1 on L2.    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  Ascites.  Peritoneum carcinomatosis.  Metastatic adenopathy as described above.      --- End of Report ---            FRANCISCO FRANCE MD; Attending Radiologist  This document has been electronically signed. Mar 22 2025  4:06PM    < end of copied text >      Imaging Personally Reviewed:  [ ] YES  [ ] NO  
NP Note discussed with  primary attending    Patient is a 85y old  Female who presents with a chief complaint of abdominal pain (26 Mar 2025 13:41)      INTERVAL HPI/OVERNIGHT EVENTS: no new complaints    MEDICATIONS  (STANDING):  isosorbide   mononitrate ER Tablet (IMDUR) 30 milliGRAM(s) Oral daily  metoprolol succinate ER 25 milliGRAM(s) Oral daily  pantoprazole  Injectable 40 milliGRAM(s) IV Push every 12 hours  senna 2 Tablet(s) Oral at bedtime    MEDICATIONS  (PRN):  acetaminophen   Oral Liquid .. 650 milliGRAM(s) Oral every 6 hours PRN Temp greater or equal to 38C (100.4F), Mild Pain (1 - 3)  morphine  - Injectable 2 milliGRAM(s) IV Push every 6 hours PRN Severe Pain (7 - 10)  morphine  - Injectable 1 milliGRAM(s) IV Push every 6 hours PRN Moderate Pain (4 - 6)  ondansetron Injectable 4 milliGRAM(s) IV Push every 6 hours PRN Nausea and/or Vomiting      __________________________________________________  REVIEW OF SYSTEMS:    CONSTITUTIONAL: No fever,           Vital Signs Last 24 Hrs  T(C): 36.7 (27 Mar 2025 05:33), Max: 36.7 (27 Mar 2025 05:33)  T(F): 98 (27 Mar 2025 05:33), Max: 98 (27 Mar 2025 05:33)  HR: 75 (27 Mar 2025 05:33) (75 - 90)  BP: 128/69 (27 Mar 2025 05:33) (112/65 - 128/69)  BP(mean): --  RR: 18 (27 Mar 2025 05:33) (18 - 18)  SpO2: 95% (27 Mar 2025 05:33) (91% - 95%)    Parameters below as of 27 Mar 2025 05:33  Patient On (Oxygen Delivery Method): nasal cannula  O2 Flow (L/min): 6      ________________________________________________  PHYSICAL EXAM:  GENERAL: NAD  CHEST/LUNG: Clear to ausculitation   HEART: S1 S2  regular;   ABDOMEN: Soft, Nontender, Nondistended; Bowel sounds present  EXTREMITIES: no cyanosis; no edema; no calf tenderness  SKIN: warm and dry; no rash  NERVOUS SYSTEM:  Awake and alert; disoriented; no new deficits    _________________________________________________  LABS:              CAPILLARY BLOOD GLUCOSE            RADIOLOGY & ADDITIONAL TESTS:    Imaging Personally Reviewed:  YES/NO    Consultant(s) Notes Reviewed:   YES/ No    Care Discussed with Consultants :     Plan of care was discussed with patient and /or primary care giver; all questions and concerns were addressed and care was aligned with patient's wishes.    
NP Note discussed with  primary attending    Patient is a 85y old  Female who presents with a chief complaint of abdominal pain (25 Mar 2025 14:10)      INTERVAL HPI/OVERNIGHT EVENTS: no new complaints    MEDICATIONS  (STANDING):  isosorbide   mononitrate ER Tablet (IMDUR) 30 milliGRAM(s) Oral daily  metoprolol succinate ER 25 milliGRAM(s) Oral daily  pantoprazole  Injectable 40 milliGRAM(s) IV Push every 12 hours  senna 2 Tablet(s) Oral at bedtime    MEDICATIONS  (PRN):  acetaminophen   Oral Liquid .. 650 milliGRAM(s) Oral every 6 hours PRN Temp greater or equal to 38C (100.4F), Mild Pain (1 - 3)  morphine  - Injectable 2 milliGRAM(s) IV Push every 6 hours PRN Severe Pain (7 - 10)  morphine  - Injectable 1 milliGRAM(s) IV Push every 6 hours PRN Moderate Pain (4 - 6)  ondansetron Injectable 4 milliGRAM(s) IV Push every 6 hours PRN Nausea and/or Vomiting      __________________________________________________  REVIEW OF SYSTEMS:    CONSTITUTIONAL: No fever,   RESPIRATORY: No cough; No shortness of breath  CARDIOVASCULAR: No chest pain, no palpitations  GASTROINTESTINAL: intermittent abd pain   NEUROLOGICAL: No headache or numbness, no tremors  MUSCULOSKELETAL: No joint pain,   GENITOURINARY: no dysuria, no frequency, no hesitancy        Vital Signs Last 24 Hrs  T(C): 36.4 (26 Mar 2025 12:59), Max: 37 (25 Mar 2025 20:40)  T(F): 97.5 (26 Mar 2025 12:59), Max: 98.6 (25 Mar 2025 20:40)  HR: 84 (26 Mar 2025 12:59) (76 - 123)  BP: 112/65 (26 Mar 2025 12:59) (112/65 - 148/78)  BP(mean): 96 (25 Mar 2025 20:40) (96 - 96)  RR: 18 (26 Mar 2025 12:59) (17 - 18)  SpO2: 93% (26 Mar 2025 12:59) (86% - 94%)    Parameters below as of 26 Mar 2025 12:59  Patient On (Oxygen Delivery Method): nasal cannula  O2 Flow (L/min): 6      ________________________________________________  PHYSICAL EXAM:  GENERAL: NAD  CHEST/LUNG: decreased breath sounds   HEART: S1 S2  regular;   ABDOMEN: mild tenderness   EXTREMITIES: no cyanosis; no edema; no calf tenderness  SKIN: warm and dry; no rash  NERVOUS SYSTEM:  Awake and alert; disoriented     _________________________________________________  LABS:              CAPILLARY BLOOD GLUCOSE            RADIOLOGY & ADDITIONAL TESTS:    Imaging Personally Reviewed:  YES/NO    Consultant(s) Notes Reviewed:   YES/ No    Care Discussed with Consultants :     Plan of care was discussed with patient and /or primary care giver; all questions and concerns were addressed and care was aligned with patient's wishes.    
NP Note discussed with  Primary Attending    Patient is a 85y old  Female who presents with a chief complaint of abdominal pain (24 Mar 2025 12:46)      INTERVAL HPI/OVERNIGHT EVENTS: no new complaints    MEDICATIONS  (STANDING):  isosorbide   mononitrate ER Tablet (IMDUR) 30 milliGRAM(s) Oral daily  metoprolol succinate ER 25 milliGRAM(s) Oral daily  pantoprazole  Injectable 40 milliGRAM(s) IV Push every 12 hours  senna 2 Tablet(s) Oral at bedtime    MEDICATIONS  (PRN):  acetaminophen   Oral Liquid .. 650 milliGRAM(s) Oral every 6 hours PRN Temp greater or equal to 38C (100.4F), Mild Pain (1 - 3)  morphine  - Injectable 2 milliGRAM(s) IV Push every 6 hours PRN Severe Pain (7 - 10)  morphine  - Injectable 1 milliGRAM(s) IV Push every 6 hours PRN Moderate Pain (4 - 6)  ondansetron Injectable 4 milliGRAM(s) IV Push every 6 hours PRN Nausea and/or Vomiting      __________________________________________________  REVIEW OF SYSTEMS:    CONSTITUTIONAL: No fever,   EYES: no acute visual disturbances  NECK: No pain or stiffness  RESPIRATORY: No cough; No shortness of breath  CARDIOVASCULAR: No chest pain, no palpitations  GASTROINTESTINAL: No pain. No nausea or vomiting; No diarrhea   NEUROLOGICAL: No headache or numbness, no tremors  MUSCULOSKELETAL: No joint pain, no muscle pain  GENITOURINARY: no dysuria, no frequency, no hesitancy  PSYCHIATRY: no depression , no anxiety  ALL OTHER  ROS negative        Vital Signs Last 24 Hrs  T(C): 36.4 (25 Mar 2025 13:57), Max: 36.7 (24 Mar 2025 20:22)  T(F): 97.6 (25 Mar 2025 13:57), Max: 98.1 (24 Mar 2025 20:22)  HR: 94 (25 Mar 2025 13:57) (78 - 107)  BP: 148/78 (25 Mar 2025 13:57) (119/73 - 148/78)  BP(mean): --  RR: 17 (25 Mar 2025 13:57) (17 - 18)  SpO2: 90% (25 Mar 2025 13:57) (90% - 93%)    Parameters below as of 25 Mar 2025 13:57  Patient On (Oxygen Delivery Method): nasal cannula  O2 Flow (L/min): 4      ________________________________________________  PHYSICAL EXAM:  GENERAL: NAD  HEENT: Normocephalic;  conjunctivae and sclerae clear; moist mucous membranes;   NECK : supple  CHEST/LUNG: Clear to auscultation bilaterally with good air entry   HEART: S1 S2  regular; no murmurs, gallops or rubs  ABDOMEN: Soft, Nontender, Nondistended; Bowel sounds present  EXTREMITIES: no cyanosis; no edema; no calf tenderness  SKIN: warm and dry; no rash  NERVOUS SYSTEM:  Awake and alert; Oriented  to place, person and time ; no new deficits    _________________________________________________  LABS:              CAPILLARY BLOOD GLUCOSE      RADIOLOGY & ADDITIONAL TESTS:      < from: CT Abdomen and Pelvis w/ IV Cont (03.22.25 @ 15:41) >  PROCEDURE:  CT of the Abdomen and Pelvis was performed.  Sagittal and coronal reformats were performed.    FINDINGS:  LOWER CHEST: Cardiomegaly. Subtle aortic valve calcification right   coronary artery calcification versus stent. Moderate hiatal hernia.    LIVER: The liver is enlarged. No focal liver lesion identified.  BILE DUCTS: Normal caliber.  GALLBLADDER: Within normal limits.  SPLEEN: Within normal limits.  PANCREAS: Within normal limits.  ADRENALS: Within normal limits.  KIDNEYS/URETERS: Normal size. No hydronephrosis. There appear to be   subtle bilateral renal cortical nephrographic defects    BLADDER: Within normal limits.  REPRODUCTIVE ORGANS: Status post hysterectomy. Ovaries not identified in   the side likely status post oophorectomy.    BOWEL: Evidence of bowel obstruction due to an approximately 10 cm tumor   mass involving the cecum and ascending colon. Increased as compared to   prior study 4/4/2024. The cecal pole is distended with liquefied feces.   The tumor mass is probably extending into the terminal ileum. Diameter of   the inferior cecal pole measures 6.4 cm. The small bowel is markedly   distended there is wall thickening of the terminal and distal ileum. No   bowel pneumatosis. No mesenteric venous or portal venous air. Large   bilocular periampullary-second part of duodenum diverticulum.  PERITONEUM/RETROPERITONEUM: Moderate ascites. Infiltration of the omental   fat. 1.0 cm soft tissue omental nodule on the right anteriorly in the   upper pelvis only metastatic. Probable peritoneal carcinomatosis. 1.0 cm   soft tissue nodule in the root of the mesentery.  VESSELS: Within normallimits.  LYMPH NODES: Enlarged right medial pericolic and  mesenteric nodes likely   metastatic.  ABDOMINAL WALL: Within normal limits.  BONES: Chondrocalcinosis of the symphysis pubis and lumbar spine.   Degenerative disc disease L1-L2. Slight retrolisthesis of L1 on L2.    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  Ascites.  Peritoneum carcinomatosis.  Metastatic adenopathy as described above.    < end of copied text >      < from: Xray Chest 1 View- PORTABLE-Urgent (Xray Chest 1 View- PORTABLE-Urgent .) (03.24.25 @ 21:05) >  INTERPRETATION:  TIME OF EXAM: March 24, 2025 at 8:52 PM.    CLINICAL INFORMATION: Shortness of breath. Anemia. Metastatic colon   cancer.    COMPARISON:  April 3, 2024.    TECHNIQUE:   AP Portable chest x-ray.    INTERPRETATION:    Heart size and the mediastinum cannot be accurately evaluated on this   projection. Calcified tortuous thoracic aorta.  The right hemidiaphragm is now elevated.  There is bilateral lower lung linear atelectasis and/or scar.  There are bilateral trace pleural effusions.  No pneumothorax is seen.  There is osteoarthritic degenerative change of the spine.  Old left clavicular and old left rib fractures.    IMPRESSION:  The right hemidiaphragm is now elevated.    Bilateral lower lung linear atelectasis and or scar.    Bilateral trace pleural effusions.    < end of copied text >      Imaging  Reviewed:  YES    Consultant(s) Notes Reviewed:   YES      Plan of care was discussed with patient and /or primary care giver; all questions and concerns were addressed

## 2025-03-28 ENCOUNTER — TRANSCRIPTION ENCOUNTER (OUTPATIENT)
Age: 85
End: 2025-03-28

## 2025-03-28 VITALS
OXYGEN SATURATION: 92 % | RESPIRATION RATE: 18 BRPM | TEMPERATURE: 98 F | DIASTOLIC BLOOD PRESSURE: 70 MMHG | HEART RATE: 95 BPM | SYSTOLIC BLOOD PRESSURE: 126 MMHG

## 2025-03-28 PROCEDURE — 85045 AUTOMATED RETICULOCYTE COUNT: CPT

## 2025-03-28 PROCEDURE — 82962 GLUCOSE BLOOD TEST: CPT

## 2025-03-28 PROCEDURE — 83605 ASSAY OF LACTIC ACID: CPT

## 2025-03-28 PROCEDURE — 86850 RBC ANTIBODY SCREEN: CPT

## 2025-03-28 PROCEDURE — 85025 COMPLETE CBC W/AUTO DIFF WBC: CPT

## 2025-03-28 PROCEDURE — 86900 BLOOD TYPING SEROLOGIC ABO: CPT

## 2025-03-28 PROCEDURE — 83540 ASSAY OF IRON: CPT

## 2025-03-28 PROCEDURE — 83690 ASSAY OF LIPASE: CPT

## 2025-03-28 PROCEDURE — 86901 BLOOD TYPING SEROLOGIC RH(D): CPT

## 2025-03-28 PROCEDURE — 74177 CT ABD & PELVIS W/CONTRAST: CPT | Mod: MC

## 2025-03-28 PROCEDURE — 99239 HOSP IP/OBS DSCHRG MGMT >30: CPT

## 2025-03-28 PROCEDURE — 82728 ASSAY OF FERRITIN: CPT

## 2025-03-28 PROCEDURE — 85610 PROTHROMBIN TIME: CPT

## 2025-03-28 PROCEDURE — 96374 THER/PROPH/DIAG INJ IV PUSH: CPT

## 2025-03-28 PROCEDURE — 84100 ASSAY OF PHOSPHORUS: CPT

## 2025-03-28 PROCEDURE — 36415 COLL VENOUS BLD VENIPUNCTURE: CPT

## 2025-03-28 PROCEDURE — 83735 ASSAY OF MAGNESIUM: CPT

## 2025-03-28 PROCEDURE — 99285 EMERGENCY DEPT VISIT HI MDM: CPT

## 2025-03-28 PROCEDURE — 94640 AIRWAY INHALATION TREATMENT: CPT

## 2025-03-28 PROCEDURE — 85730 THROMBOPLASTIN TIME PARTIAL: CPT

## 2025-03-28 PROCEDURE — 83550 IRON BINDING TEST: CPT

## 2025-03-28 PROCEDURE — 80053 COMPREHEN METABOLIC PANEL: CPT

## 2025-03-28 PROCEDURE — 71045 X-RAY EXAM CHEST 1 VIEW: CPT

## 2025-03-28 RX ADMIN — METOPROLOL SUCCINATE 25 MILLIGRAM(S): 50 TABLET, EXTENDED RELEASE ORAL at 05:50

## 2025-03-28 RX ADMIN — Medication 40 MILLIGRAM(S): at 11:05

## 2025-03-28 RX ADMIN — ISOSORBIDE MONONITRATE 30 MILLIGRAM(S): 60 TABLET, EXTENDED RELEASE ORAL at 11:05

## 2025-03-28 NOTE — DISCHARGE NOTE NURSING/CASE MANAGEMENT/SOCIAL WORK - PATIENT PORTAL LINK FT
You can access the FollowMyHealth Patient Portal offered by [facility] by registering at the following website: [email or web address]. By joining [facility]’s FollowMyHealth portal, you will also be able to view your health information using other applications (apps) compatible with our system.

## 2025-03-28 NOTE — CHART NOTE - NSCHARTNOTEFT_GEN_A_CORE
Pt seen for moderate malnutrition follow up    On comfort measures per palliative 3/23. No recent episodes of nausea, vomiting, diarrhea or constipation per chart. Last BM noted on 3/27 per RN flowsheets. Intake is <50% of full liquid diet per tray observation. Food preferences explored and forwarded to dietary. Hyponatremia (132).    Diet Prescription: Diet, Full Liquid:   Fiber/Residue Restricted  Supplement Feeding Modality:  Oral  Ensure Enlive Cans or Servings Per Day:  1       Frequency:  Three Times a day (03-24-25 @ 10:31)    Pertinent Medications: MEDICATIONS  (STANDING):  isosorbide   mononitrate ER Tablet (IMDUR) 30 milliGRAM(s) Oral daily  metoprolol succinate ER 25 milliGRAM(s) Oral daily  pantoprazole  Injectable 40 milliGRAM(s) IV Push every 12 hours  senna 2 Tablet(s) Oral at bedtime    MEDICATIONS  (PRN):  acetaminophen   Oral Liquid .. 650 milliGRAM(s) Oral every 6 hours PRN Temp greater or equal to 38C (100.4F), Mild Pain (1 - 3)  morphine  - Injectable 2 milliGRAM(s) IV Push every 6 hours PRN Severe Pain (7 - 10)  morphine  - Injectable 1 milliGRAM(s) IV Push every 6 hours PRN Moderate Pain (4 - 6)  ondansetron Injectable 4 milliGRAM(s) IV Push every 6 hours PRN Nausea and/or Vomiting    Pertinent Labs:  03-23 Phos 2.8 mg/dL 03-23 Alb 2.0 g/dL[L]     CAPILLARY BLOOD GLUCOSE          Weight: Weight (kg): 60 (03-22 @ 13:19)   Height: 64 in   IBW: 120 lbs +/-10%  BMI: 22.7 kg/m^2    Physical Assessment, per flowsheets:  Edema: 1+ left foot, right foot  Pressure Injury: No pressure injuries noted per RN flowsheets      Estimated Needs:   [X] No change since previous assessment     Previous Nutrition Diagnosis:  [x] Malnutrition   Nutrition Diagnosis is [x] ongoing   New Nutrition Diagnosis: [x] not applicable     Education:  [x] Provided - Encouraged pt to continue eating as able. Emphasized the importance of eating adequate protein/calorie through PO meal intake/PO supplement intake.      Interventions:   1) On comfort measures, advance diet as medically feasible.   2) Encourage PO intake and honor food preferences as able.  3) Monitor PO intake, labs, weights, BM's, and skin integrity.    Cornel Mcguire RD (Available on teams)

## 2025-03-28 NOTE — DISCHARGE NOTE NURSING/CASE MANAGEMENT/SOCIAL WORK - NSDCPEFALRISK_GEN_ALL_CORE
For information on Fall & Injury Prevention, visit: [email or web address] OR  [email or web address] OR  https://www.cdc.gov/steadi/patient.html
PCN allergy

## 2025-03-28 NOTE — DISCHARGE NOTE NURSING/CASE MANAGEMENT/SOCIAL WORK - FINANCIAL ASSISTANCE
[facility] provides services at a reduced cost to those who are determined to be eligible through [facility]’s financial assistance program. Information regarding [facility]’s financial assistance program can be found by going to [email or web address] or by calling [phone].